# Patient Record
Sex: FEMALE | Race: WHITE | NOT HISPANIC OR LATINO | Employment: FULL TIME
[De-identification: names, ages, dates, MRNs, and addresses within clinical notes are randomized per-mention and may not be internally consistent; named-entity substitution may affect disease eponyms.]

---

## 2017-08-03 NOTE — TELEPHONE ENCOUNTER
Patient came into office and stated that she had labs done that were ordered by Dr. Jose Garay, psychiatrist. Patient stated that he had not received a copy of the lab results.  Patient was told to come into our office, as Dr. Elif Hanna is her PCP, and have

## 2017-08-23 ENCOUNTER — CHARTING TRANS (OUTPATIENT)
Dept: OTHER | Age: 22
End: 2017-08-23

## 2017-08-25 NOTE — PROGRESS NOTES
Patient is here with parent/guardian for a yearly physical exam. The patient is feeling well and no complaints per patient and/or parent or guardian.       Dizziness/chest pain/SOB or excessive fatigue with exercise: No  Unexplained fainting or near-faintin distress  Head: normocephalic, atraumatic  Eyes: YANNICK, EOMI, cornea and conjunctiva clear  Ears:  tympanic membranes intact bilaterally with out reddening or retraction, external canals appear normal  Nose: pink nasal mucosa without discharge, nares paten

## 2017-09-27 NOTE — PROGRESS NOTES
HPI:    Patient ID: Jerrol Bosworth is a 23year old female.     HPI  Patient presents with:  Sore Throat: more like burning - getting worse - family at home sick but started with her  Headache: turn into migraines  Cough/URI      Review of Systems  Except f (DIFLUCAN) 150 MG Oral Tab 1 tablet 0      Sig: Take 1 tablet (150 mg total) by mouth once.            Imaging & Referrals:  None       DS#[id number]

## 2017-10-12 NOTE — PROGRESS NOTES
HPI:    Patient ID: Tanya Moise is a 23year old female. HPI  Patient states that she does cheerleading and has frequently got her head bumped during that.   Over the past 2-3 weeks she has noticed increased headaches which have become daily along wit normal, negative Romberg sign, coordination with finger to nose and heel to shin intact. No focal neurologic deficit noted. Psychiatric:  mood and affect are normal, no apparent thought disorder, judgement and insight appear intact.            ASSESSMENT/P

## 2018-08-30 ENCOUNTER — LAB SERVICES (OUTPATIENT)
Dept: OTHER | Age: 23
End: 2018-08-30

## 2018-08-30 ENCOUNTER — CHARTING TRANS (OUTPATIENT)
Dept: OTHER | Age: 23
End: 2018-08-30

## 2018-09-04 ENCOUNTER — CHARTING TRANS (OUTPATIENT)
Dept: OTHER | Age: 23
End: 2018-09-04

## 2018-09-06 LAB
C TRACH RRNA SPEC QL NAA+PROBE: NEGATIVE
N GONORRHOEA RRNA SPEC QL NAA+PROBE: NEGATIVE
SPECIMEN SOURCE: NORMAL
SPECIMEN SOURCE: NORMAL
T VAGINALIS RRNA SPEC QL NAA+PROBE: NEGATIVE

## 2018-11-03 VITALS
SYSTOLIC BLOOD PRESSURE: 100 MMHG | BODY MASS INDEX: 26.66 KG/M2 | WEIGHT: 156.13 LBS | HEIGHT: 64 IN | DIASTOLIC BLOOD PRESSURE: 68 MMHG | HEART RATE: 70 BPM

## 2018-11-09 NOTE — ED NOTES
Knee immobilizer applied to left leg. Patient did not tolerate it very well tearful on cart  Family remains bedside.
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
## 2018-11-09 NOTE — ED PROVIDER NOTES
Patient has history of patellar dislocation. Patient was standing and twisted her body. She felt as though her patella dislocated. She continues to have severe pain in the left knee with inability to bear weight and bend.     On inspection, there is some

## 2018-11-10 NOTE — ED PROVIDER NOTES
Patient Seen in: [facility] Emergency Department    History   Patient presents with:  Lower Extremity Injury (musculoskeletal)    Stated Complaint: Lt knee sharp pain when she went to turn her body.  past hx of dislocating this *    32-year-old femal negative. Positive for stated complaint: Lt knee sharp pain when she went to turn her body. past hx of dislocating this *  Other systems are as noted in HPI. Constitutional and vital signs reviewed.       All other systems reviewed and negative except takes less than 2 seconds. She is not diaphoretic. Psychiatric: She has a normal mood and affect. Her behavior is normal. Judgment and thought content normal.   Nursing note and vitals reviewed.           ED Course   Labs Reviewed - No data to display  Xr diagnosis)    Disposition:  Discharge  11/9/2018  5:39 pm    Follow-up:  Balbir Bello MD  [address] 21 40 10    In 1 week  As needed, If symptoms worsen        Medications Prescribed:  Discharge [facility]

## 2018-11-27 VITALS
BODY MASS INDEX: 28.79 KG/M2 | SYSTOLIC BLOOD PRESSURE: 110 MMHG | WEIGHT: 162.5 LBS | HEIGHT: 63 IN | DIASTOLIC BLOOD PRESSURE: 70 MMHG

## 2019-03-20 NOTE — PROGRESS NOTES
CHIEF COMPLAINT:   Patient presents with:  Cough: cough is dry and with phlegm, ear discomfort, body sweats,  headches, nose congestion, upset stomach, body aches, sore throat x 3 dys       HPI:   Katherine Morales is a 26year old female who presents for upp LUNGS: denies shortness of breath or wheezing, See HPI  CARDIOVASCULAR: denies chest pain or palpitations   GI: denies N/V/C or abdominal pain  NEURO: + intermittent headaches    EXAM:   /64   Pulse 87   Temp 98.3 °F (36.8 °C) (Oral)   Ht 63\"   Wt 1 You have a viral upper respiratory illness (URI), which is another term for the common cold. This illness is contagious during the first few days. It is spread through the air by coughing and sneezing.  It may also be spread by direct contact (touching the · Cough with lots of colored sputum (mucus)  · Severe headache; face, neck, or ear pain  · Difficulty swallowing due to throat pain  · Fever of 100.4°F (38°C) or higher, or as directed by your healthcare provider  Call 911  Call 911 if any of these occur: · For sore throats caused by allergies, try antihistamines to block the allergic reaction. · Remember: unless a sore throat is caused by a bacterial infection, antibiotics won’t help you.   Prevent future sore throats  Prevention tips include the following

## 2019-04-29 NOTE — ED NOTES
Pt provided water and instructed on small sips to see how patient tolerates.  Will recheck in 10 min

## 2019-04-29 NOTE — ED INITIAL ASSESSMENT (HPI)
Pt c/o nausea and vomiting x48 hours - was seen at [facility] in [place] and was told that there was nothing wrong     4 years had same episode for around 2 weeks - no conclusion on why she felt that way

## 2019-04-29 NOTE — TELEPHONE ENCOUNTER
Called pt to schedule Follow up as pt was seen in ER for Nausea/Vomiting/Diarrhea (gastrointestinal)     Pt states she will call back was resting.

## 2019-04-29 NOTE — ED PROVIDER NOTES
Patient Seen in: [facility] Emergency Department    History   Patient presents with:  Nausea/Vomiting/Diarrhea (gastrointestinal)    Stated Complaint: n/v    HPI    Patient has a history of dual diagnosis depression, anxiety, and cannabis use.   She Use      Smoking status: Current Every Day Smoker        Packs/day: 0.00      Smokeless tobacco: Never Used      Tobacco comment: 4-5/day    Alcohol use: Not on file      Comment: \"Rarely\"  Last drink New Year's Codi    Drug use: Yes      Frequency: 4.0 t components within normal limits   URINALYSIS, ROUTINE - Abnormal; Notable for the following components:    Clarity Urine Hazy (*)     Spec Gravity 1.032 (*)     Ketones Urine 80  (*)     Blood Urine Moderate (*)     Protein Urine 100  (*)     RBC URINE 3-5 nausea. She was also treated with Pepcid     CBC shows elevated white count with a left shift on differential.  Hemoglobin platelets are normal  Metabolic panel shows mild hypokalemia treated with oral potassium supplement.   Creatinine was normal.  Mild e

## 2019-04-29 NOTE — ED NOTES
Pt requesting ice chips. Ice chips provided along with zofran. Pt offered suppository medication for home for nausea vomiting to which patient states she would try. Patient has no questions/concerns voiced.      Bedside report to [place]

## 2019-04-30 PROBLEM — E87.1 HYPONATREMIA: Status: ACTIVE | Noted: 2019-04-30

## 2019-04-30 PROBLEM — R79.89 AZOTEMIA: Status: ACTIVE | Noted: 2019-04-30

## 2019-04-30 PROBLEM — R73.9 HYPERGLYCEMIA: Status: ACTIVE | Noted: 2019-04-30

## 2019-04-30 PROBLEM — E86.0 DEHYDRATION: Status: ACTIVE | Noted: 2019-04-30

## 2019-04-30 NOTE — TELEPHONE ENCOUNTER
Spoke with mother, pt is sipping water,   Urinating well, last vomiting 10 minutes ago. Inquired how many times pt has vomiting today and mother answered, \"Too many to count\". Loose stools not since Friday, no BM since. Denies Fever.   Mother states pt h

## 2019-04-30 NOTE — ED INITIAL ASSESSMENT (HPI)
Patient c/o five days of N/V/D. States she has been seen at two other emergency rooms for same symptoms. Today, feels weak and dizzy. Unable to hold down even small sips of liquid. Urinated 4-5 days today.

## 2019-04-30 NOTE — TELEPHONE ENCOUNTER
The mother states the patient is still sick. She has not able to keep down water or meds since Friday. She was out of state in [place] Fri & Sat where they had ecoli reporting.  The patient was in the ER in [place] and also when she came back on Sunday to the ER at

## 2019-04-30 NOTE — TELEPHONE ENCOUNTER
Per Dr. Ramos Zepeda: due to excessive, repeated vomiting go to ER. Mother informed, expressed understanding and agreement. Task completed.

## 2019-05-01 PROBLEM — E87.6 HYPOKALEMIA: Status: ACTIVE | Noted: 2019-05-01

## 2019-05-01 PROBLEM — R11.2 INTRACTABLE VOMITING WITH NAUSEA, UNSPECIFIED VOMITING TYPE: Status: ACTIVE | Noted: 2019-05-01

## 2019-05-01 NOTE — ED PROVIDER NOTES
Patient Seen in: [facility] [place]    History   Patient presents with:  Abdomen/Flank Pain (GI/)  Nausea/Vomiting/Diarrhea (gastrointestinal)    Stated Complaint: [address] [place] N/V +ABD PAIN     HPI    Patient presents with Comment: \"Rarely\"  Last drink New Year's Codi    Drug use: Yes      Frequency: 4.0 times per week      Types: Cannabis      Comment: 1 gm 3-4 times/week      Review of Systems    Positive for stated complaint: STATES 5TH DAY OF N/V +ABD PAIN   Other syste Abnormal; Notable for the following components:    Potassium 2.7 (*)     All other components within normal limits   URINE MICROSCOPIC W REFLEX CULTURE - Abnormal; Notable for the following components:    Bacteria Urine Rare (*)     Squamous Epi.  Cells Mod PANCREAS:  No lesion, fluid collection, ductal dilatation, or atrophy. SPLEEN:  No enlargement or focal lesion. KIDNEYS:  No mass, obstruction, or calcification. ADRENALS:  No mass or enlargement. AORTA/VASCULAR:  No aneurysm or dissection.   RETROPERIT given a potassium solution and initially felt okay but then ultimately vomited that as well. MDM   The patient has had 3 visits now for vomiting that has been intractable to medication.   She is hypokalemic and still barely able to tolerate sips of water

## 2019-05-01 NOTE — OPERATIVE REPORT
Operative Report-Esophagogastroduodenoscopy      PREOPERATIVE DIAGNOSIS/INDICATION:  1. Epigastric pain  2. Nausea vomiting  POSTOPERTATIVE DIAGNOSIS:  1.  Severe Class D esophagitis extending from UES to LES with friability, oozing biopsies.  - IV PPI bid  - Reglan/Zofran/Ativan as needed for nausea  - Clear liquid diet  - Home when able to tolerated full liquids.   CC Report:     Fausto Berg  5/1/2019  10:31 AM

## 2019-05-01 NOTE — PLAN OF CARE
Pt alert and oriented x4 this Am, slept a little overnight. Pt denies SOB and VSS. Potassium infusing, per protocol. GI consulted, in to see, will do EGD this Am. Consent to be obtained in Endo. Suicide precautions in place.  Pt stable and an accompanied by

## 2019-05-01 NOTE — ED NOTES
Patient states she is feeling better and wants to go home. States having decrease abdominal cramping and feels hungry. Advised of low potassium and treatment. Patient verbalized understanding.

## 2019-05-01 NOTE — CONSULTS
[facility]                       Gastroenterology [address] [facility] Gastroenterology    Charmayne Macho Patient Status:  Observation    1995 MRN [id number]   [facility] ENDOSCOPY Attending Nannette Madden MD   [facility] chloride IVPB premix 20 mEq 20 mEq Intravenous Once   [COMPLETED] sodium chloride 0.9% IV bolus 1,000 mL 1,000 mL Intravenous Once   [COMPLETED] Metoclopramide HCl (REGLAN) injection 10 mg 10 mg Intravenous Once   [COMPLETED] diphenhydrAMINE HCl (BENADRYL) chronic arthritis, myalgias, arthralgias            Genitourinary:  The patient reports no history of recurrent urinary tract infections, hematuria, dysuria, or nephrolithiasis           Psychiatric: The patient reports no history of depression, anxiety, s 05/01/2019     Recent Labs   Lab 04/28/19 2037 04/30/19  1853 04/30/19  1933 05/01/19  0528   * 117*  --  122*   BUN 17 18  --  13   CREATSERUM 0.85 0.76  --  0.62   GFRAA 113 130  --  149   GFRNAA 98 112  --  129   CA 9.5 9.1  --  8.4*    13

## 2019-05-01 NOTE — CONSULTS
[facility]  Report of Psychiatric Consultation    Zaira Paz Patient Status:  Observation    1995 MRN [id number]   [facility] 4NW-A Attending Marcelo Gibson MD   [facility] Day # 1 PCP Radha Johnson DO     Date of Admission:  motivation, and difficult with sleep and concentration, but NO hopelessness, voices/visions, or paranoid ideation. No elevated mood, racing thoughts, decreased need for sleep, grandiose thoughts.     Past Psychiatric History:   1) Major depressive disorder, Anxiety Brother    • Diabetes Maternal Grandmother       reports that she quit smoking 8 days ago. She smoked 0.00 packs per day. She has never used smokeless tobacco. She reports that she has current or past drug history. Drug: Cannabis.  Frequency: 4.00 t Congruent    Thought process: logical  Thought content: no delusions  Perceptions: no hallucinations  Associations: Intact    Orientation: Oriented person, place, time, situation  Attention and Concentration:   fair  Memory:  intact recent and intact remot

## 2019-05-01 NOTE — ANESTHESIA PREPROCEDURE EVALUATION
PRE-OP EVALUATION    Patient Name: Michael Senior    Pre-op Diagnosis: NAUSEA,VOMITTING    Procedure(s):  ESOPHAGOGASTRODUODENOSCOPY    Surgeon(s) and Role:     Alexis Gong MD - Primary    Pre-op vitals reviewed.   Temp: 98.9 °F (37.2 °C)  Pulse Powd Pack Take 20 mEq by mouth daily for 5 doses. Disp: 5 packet Rfl: 0   ondansetron 8 MG Oral Tablet Dispersible Take 8 mg by mouth every 12 (twelve) hours as needed for Nausea.  Disp:  Rfl:    Promethazine HCl 25 MG Oral Tab Take 25 mg by mouth every 6 ( Cannabis   Comment: 1 gm 3-4 times/week     Available pre-op labs reviewed.   Lab Results   Component Value Date    WBC 9.4 05/01/2019    RBC 4.53 05/01/2019    HGB 13.4 05/01/2019    HCT 39.4 05/01/2019    MCV 87.0 05/01/2019    MCH 29.6 05/01/2019    MCHC

## 2019-05-01 NOTE — PROGRESS NOTES
IM addendum to Dr. Dar Becker H&P:    Pt seen and examined. Back from EGD. C/o throat pain, chest pain, nausea.     /61   Pulse 77   Temp 98.9 °F (37.2 °C) (Oral)   Resp 16   Ht 5' 3\" (1.6 m)   Wt 160 lb 14.4 oz (73 kg)   LMP 04/14/2019   SpO2 94%

## 2019-05-01 NOTE — H&P
[facility]                                                               History & Physical         Shahram Horton Patient Status:  Observation    1995 MRN [id number]   [facility] 4NW-A Attending Fuasto Maldonado MD   [address] Day # Diagnosis Date   • Depression    • Eating disorder    • Factor V Leiden deficiency    • Fibromyalgia    • Headache    • Intractable vomiting    • Migraines    • Primary Raynaud's phenomenon        Past Surgical History:   Procedure Laterality Date   • ES nightly. Disp:  Rfl: 0 Taking   Levonorgest-Eth Estrad 91-Day 0.15-0.03 MG Oral Tab Take 1 tablet by mouth once daily. Disp:  Rfl: 0 Not Taking       Review of Systems:  A comprehensive 14 point review of systems was completed.   Pertinent positives and neg irritable bowel syndrome, rule out marijuana hyperemesis syndrome, rule out eating disorder with previous history of eating disorder and depression  1. IV Zofran as needed. 2.   IV Protonix. 3.  IV fluids. 4.  Will keep n.p.o.   5.  GI consult.    6.

## 2019-05-01 NOTE — PLAN OF CARE
NURSING ADMISSION NOTE      Patient admitted via Wheelchair  Oriented to room. Safety precautions initiated. Bed in low position. Call light in reach.     Pt is alert and oriented X4, is very sleepy,no c/o pain, mother at bedside, pt had 3 episodes o

## 2019-05-01 NOTE — ED NOTES
REPORT CALLED AND GIVEN TO JACOBO GARLAND OK PER MD FOR FAMILY TO DRIVE TO HOSPITAL, PT WILL BE ADM TO ROOM 406.

## 2019-05-01 NOTE — ANESTHESIA POSTPROCEDURE EVALUATION
[address] [facility] Patient Status:  Observation   Age/Gender 26year old female MRN [id number]   Location [address] [facility]. Attending Anshu Cole, [facility] Se Day # 0 PCP Ash Fontanez DO       Anesthesia Post-op Note    Procedure(

## 2019-05-02 NOTE — DIETARY NOTE
Lenin Fisher [address]     Admitting diagnosis:  Dehydration [E86.0]  Hypokalemia [E87.6]  Intractable vomiting with nausea, unspecified vomiting type [R11.2]    Ht: 160 cm (5' 3\")  Wt: 73 kg (160 lb 14.4 oz).    BMI: Body m

## 2019-05-02 NOTE — BH PROGRESS NOTE
Psychiatry consult completed. Patient plans to follow-up with her current outpatient psychiatrist.  She has a therapist as well, but is considering switching. She is not interested in a dual dx program at this time.  No follow-up planned by psych liaison u

## 2019-05-02 NOTE — PLAN OF CARE
Problem: PAIN - ADULT  Goal: Verbalizes/displays adequate comfort level or patient's stated pain goal  Description  INTERVENTIONS:  - Encourage pt to monitor pain and request assistance  - Assess pain using appropriate pain scale  - Administer analgesics replacement as ordered  - Monitor response to electrolyte replacements, including rhythm and repeat lab results as appropriate  - Fluid restriction as ordered  - Instruct patient on fluid and nutrition restrictions as appropriate  Outcome: Progressing   AA

## 2019-05-02 NOTE — PROGRESS NOTES
Gastroenterology Progress Note  S: Still with nausea, retching. Tolerating small amounts of clears  O: BP 99/57 (BP Location: Left arm)   Pulse 71   Temp 98.7 °F (37.1 °C) (Oral)   Resp 18   Ht 63\"   Wt 160 lb 14.4 oz   LMP 04/14/2019   SpO2 98%   BMI 28.

## 2019-05-02 NOTE — PROGRESS NOTES
[facility]  Progress Note     Mark Lazaro Patient Status:  Observation    1995 MRN [id number]   [facility] 4NW-A Attending Brien Qiu MD   Hosp Day # 0 PCP Sarai Palma DO     Chief Complaint: Nausea, vomiting, chest 13* 16  --    ALT 59* 45  --  33  --    BILT 1.4 1.5  --  1.2  --    TP 7.9 8.6*  --  6.8  --        Estimated Creatinine Clearance: 118.7 mL/min (based on SCr of 0.62 mg/dL). No results for input(s): PTP, INR in the last 168 hours.     No results for in

## 2019-05-03 NOTE — PLAN OF CARE
Problem: Patient/Family Goals  Goal: Patient/Family Long Term Goal  Description  Patient's Long Term Goal:    Interventions:  -   - See additional Care Plan goals for specific interventions  Outcome: Completed  Goal: Patient/Family Short Term Goal  Descr needed  - Optimize oral hygiene and moisture  - Encourage food from home; allow for food preferences  - Enhance eating environment  Outcome: Completed     Problem: METABOLIC/FLUID AND ELECTROLYTES - ADULT  Goal: Electrolytes maintained within normal limits

## 2019-05-03 NOTE — PROGRESS NOTES
Patient okay for discharge. PIV removed, discharge instruction given and explained to patient in detail. All questions were answered. Scripts given to patient. Patient refused transport. Walked by herself with father at discharge.

## 2019-05-03 NOTE — PROGRESS NOTES
Gastroenterology Progress Note  S: No further vomiting yesterday, still with odynophagia, but tolerating liquids, some fruit and smoothies  O: /86 (BP Location: Right arm)   Pulse 90   Temp 98.1 °F (36.7 °C) (Oral)   Resp 16   Ht 63\"   Wt 160 lb 14.

## 2019-05-03 NOTE — PLAN OF CARE
Patient alert and oriented X4. Family and friends at bedside, in high spirit. No complaints of pain at this time. Denies nausea and vomiting. Tolerating Full liquid Diet so far. Vital signs stable. Call light in reach.        Problem: PAIN - ADULT  Goal: Ve METABOLIC/FLUID AND ELECTROLYTES - ADULT  Goal: Electrolytes maintained within normal limits  Description  INTERVENTIONS:  - Monitor labs and rhythm and assess patient for signs and symptoms of electrolyte imbalances  - Administer electrolyte replacement a

## 2019-05-03 NOTE — PROGRESS NOTES
[facility]  Progress Note     Zaira Paz Patient Status:  Observation    1995 MRN [id number]   [facility] 4NW-A Attending Marcelo Gibson MD   Hosp Day # 0 PCP Radha Johnson DO     Chief Complaint: Nausea, vomiting, chest --  1.2  --    TP 7.9 8.6*  --  6.8  --        Estimated Creatinine Clearance: 118.7 mL/min (based on SCr of 0.62 mg/dL). No results for input(s): PTP, INR in the last 168 hours. No results for input(s): TROP, CK in the last 168 hours.          Imagin

## 2019-05-08 NOTE — PROGRESS NOTES
Results reviewed. Released to [address] Tests show no significant abnormalities other than severe reflux esophagitis, negative for infection.

## 2019-05-26 NOTE — DISCHARGE SUMMARY
[facility]  DISCHARGE SUMMARY     Luan Syed Patient Status:  Observation    1995 MRN [id number]   [facility] 4NW-A Attending No att. providers found   Hosp Day # 0 PCP Maria Luisa Nevarez DO     Date of Admission: 2019 severe depression and was hospitalized for this in January 2019. Brief Synopsis: Patient was admitted with complaints of intractable nausea, vomiting, abdominal pain. He she was seen by GI in consultation. She was started on PPI.   She underwent EGD wh total) by mouth 3 (three) times daily as needed (Anxiety, sleep). Quantity:  30 capsule  Refills:  0     Levonorgest-Eth Estrad 91-Day 0.15-0.03 MG Tabs  Commonly known as:  SEASONALE      Take 1 tablet by mouth once daily.    Refills:  0     ondansetron [id number]  [phone]    Go on 5/29/2019  for a follow-up with the GI nurse practitoner at 9 am     Appointments for Next 30 Days 5/26/2019 - 6/25/2019      Date Arrival Time Visit Type Length Department Provider     5/29/2019  9:20

## 2019-06-15 NOTE — PROGRESS NOTES
CHIEF COMPLAINT:   Patient presents with:  Eye Problem: left eye watery, red, sensitivty to light x2 days      HPI:   Ion Ardon is a 26year old female who presents with chief concern of \"pink eye\". Patient reports redness, watering, and photophobia Frida Mix MD at [facility] ENDOSCOPY   • ESOPHAGOGASTRODUODENOSCOPY (EGD) N/A 6/9/2014    Performed by Lupe Willams MD at [facility] ENDOSCOPY   • OTHER      tubes in ears when child    • WISDOM TEETH REMOVED        Family History   Problem Relation A visible discharge. No pain to periorbital region  HENT: atraumatic, normocephalic,ears and throat are clear  NECK: supple, non tender  LUNGS: clear to auscultation bilaterally. CARDIO: RRR without murmur  LYMPH: no preauricular lymphadenopathy.  No cervic

## 2019-09-05 ENCOUNTER — OFFICE VISIT (OUTPATIENT)
Dept: OBGYN | Age: 24
End: 2019-09-05

## 2019-09-05 VITALS
BODY MASS INDEX: 28.21 KG/M2 | DIASTOLIC BLOOD PRESSURE: 70 MMHG | WEIGHT: 159.2 LBS | SYSTOLIC BLOOD PRESSURE: 112 MMHG | HEIGHT: 63 IN

## 2019-09-05 DIAGNOSIS — Z30.09 BIRTH CONTROL COUNSELING: ICD-10-CM

## 2019-09-05 DIAGNOSIS — Z32.02 PREGNANCY EXAMINATION OR TEST, NEGATIVE RESULT: ICD-10-CM

## 2019-09-05 DIAGNOSIS — Z87.42 HISTORY OF DYSMENORRHEA: Primary | ICD-10-CM

## 2019-09-05 DIAGNOSIS — F32.81 PMDD (PREMENSTRUAL DYSPHORIC DISORDER): ICD-10-CM

## 2019-09-05 PROBLEM — F32.A ANXIETY AND DEPRESSION: Status: ACTIVE | Noted: 2019-09-05

## 2019-09-05 PROBLEM — F41.9 ANXIETY AND DEPRESSION: Status: ACTIVE | Noted: 2019-09-05

## 2019-09-05 PROBLEM — D68.51 FACTOR V LEIDEN (CMD): Status: ACTIVE | Noted: 2019-09-05

## 2019-09-05 PROBLEM — F39 MOOD DISORDER (CMD): Status: ACTIVE | Noted: 2019-09-05

## 2019-09-05 LAB — B-HCG UR QL: NEGATIVE

## 2019-09-05 PROCEDURE — 81025 URINE PREGNANCY TEST: CPT | Performed by: OBSTETRICS & GYNECOLOGY

## 2019-09-05 PROCEDURE — 99214 OFFICE O/P EST MOD 30 MIN: CPT | Performed by: OBSTETRICS & GYNECOLOGY

## 2019-09-05 RX ORDER — LEVONORGESTREL AND ETHINYL ESTRADIOL 0.15-0.03
1 KIT ORAL DAILY
Qty: 91 TABLET | Refills: 3 | Status: SHIPPED | OUTPATIENT
Start: 2019-09-05 | End: 2019-12-12 | Stop reason: SDUPTHER

## 2019-09-05 SDOH — SOCIAL STABILITY: SOCIAL INSECURITY
WITHIN THE LAST YEAR, HAVE TO BEEN RAPED OR FORCED TO HAVE ANY KIND OF SEXUAL ACTIVITY BY YOUR PARTNER OR EX-PARTNER?: NO

## 2019-09-05 SDOH — HEALTH STABILITY: MENTAL HEALTH: HOW OFTEN DO YOU HAVE 6 OR MORE DRINKS ON ONE OCCASION?: NEVER

## 2019-09-05 SDOH — SOCIAL STABILITY: SOCIAL INSECURITY: WITHIN THE LAST YEAR, HAVE YOU BEEN AFRAID OF YOUR PARTNER OR EX-PARTNER?: NO

## 2019-09-05 SDOH — HEALTH STABILITY: MENTAL HEALTH: HOW MANY STANDARD DRINKS CONTAINING ALCOHOL DO YOU HAVE ON A TYPICAL DAY?: 1 OR 2

## 2019-09-05 SDOH — SOCIAL STABILITY: SOCIAL INSECURITY: WITHIN THE LAST YEAR, HAVE YOU BEEN HUMILIATED OR EMOTIONALLY ABUSED IN OTHER WAYS BY YOUR PARTNER OR EX-PARTNER?: NO

## 2019-09-05 SDOH — SOCIAL STABILITY: SOCIAL INSECURITY
WITHIN THE LAST YEAR, HAVE YOU BEEN KICKED, HIT, SLAPPED, OR OTHERWISE PHYSICALLY HURT BY YOUR PARTNER OR EX-PARTNER?: NO

## 2019-09-05 SDOH — HEALTH STABILITY: MENTAL HEALTH: HOW OFTEN DO YOU HAVE A DRINK CONTAINING ALCOHOL?: MONTHLY OR LESS

## 2019-09-05 ASSESSMENT — PATIENT HEALTH QUESTIONNAIRE - PHQ9
2. FEELING DOWN, DEPRESSED OR HOPELESS: NOT AT ALL
1. LITTLE INTEREST OR PLEASURE IN DOING THINGS: NOT AT ALL
SUM OF ALL RESPONSES TO PHQ9 QUESTIONS 1 AND 2: 0
SUM OF ALL RESPONSES TO PHQ9 QUESTIONS 1 AND 2: 0

## 2019-09-17 NOTE — PROGRESS NOTES
Patient presents with:  ER F/U: [facility] for vomiting spells and was given zofran. HPI:  Patient is here for follow up. She was admitted at [address] [place] [address] for abdominal pain.  She has chronic hx of recurrent abdom Patient complains of abdominal pain. The pain is located entire abdomen. The pain is described as aching, and is 2/10 in intensity. Onset was a few months ago. Symptoms have been stable.  Aggravating factors include alcohol, eating and spicy foods Alleviati Recurrent major depressive disorder, in partial remission ([facility]     Marijuana dependence ([facility]      Past Medical History:   Diagnosis Date   • Depression    • Eating disorder    • Factor V Leiden deficiency    • Fibromyalgia    • Headache    • Intractab estradiol 0.025 MG/24HR Transdermal Patch Weekly Place 1 patch onto the skin. Disp:  Rfl:    Dicyclomine HCl 10 MG Oral Cap Take 1 capsule (10 mg total) by mouth 3 (three) times daily as needed.  Disp: 30 capsule Rfl: 0   ondansetron 8 MG Oral Tablet Disper Neck: Normal range of motion. Neck supple. Normal carotid pulses and no JVD present. No edema present. No mass and no thyromegaly present. Cardiovascular: Normal rate, regular rhythm and intact distal pulses. No murmur, rubs or gallops.    Pulmonary/Ches Ermias Baker is a 26year old female who presents with chronic abd pain with EGD results from May 2019 showing ulcerative esophagitis. She is to have a repeat EGD with GI.    The pathophysiology of reflux was discussed; anti-reflux measures such as raisin I spent 40 minutes face to face with the patient. More than 50% of that time was spent counseling the patient and/or on coordination of care. The diagnosis, prognosis, and general treatment was explained to the patient and the family.     Patient Instruct · Acid regurgitation  · Chronic sore throat  · Gum Inflammation  · Cavities  · Bad breath  · Nausea  · Pain in your upper belly (abdomen)  · Bleeding (indicated by bright red vomit or black, tarry stool)  These symptoms occur more often with reflux esophag · Take small bites and chew your food thoroughly. · Avoid large meals and heavy evening meals. Don't lie down within 2 to 3 hours of eating. · Get to or stay at a healthy weight. · Avoid alcohol, caffeine, and smoking or tobacco products.   · Brush and f Symptoms include a drippy, stuffy, and itchy nose. They also include sneezing and red and itchy eyes. You may feel tired more often.  Severe allergies may also affect your breathing and trigger a condition called asthma.   Tests can be done to see what ángel Call your healthcare provider right away if the following occur:  · Coughing or wheezing  · Fever of 100.4°F (38°C) or higher, or as directed by your healthcare provider  · Raised red bumps (hives)  · Continuing symptoms, new symptoms, or worsening symptom

## 2019-09-17 NOTE — PATIENT INSTRUCTIONS
Esophagitis     With esophagitis, the lining of the esophagus is inflamed. Do you often have burning pain in your chest? You may have esophagitis.  This is when the lining of the esophagus becomes red and swollen (inflamed). The esophagus is the tube th · Coughing, wheezing, or asthma  · Hoarseness  Diagnosis of esophagitis  Your healthcare provider will ask about your health history and symptoms. You’ll also be examined. Sometimes certain tests are needed.  These may include:  · Upper endoscopy. A thin, f · Raise your upper body by 4 to 6 inches when lying in bed. This can be done using a foam wedge. Or put blocks under the legs at the head of your bed. Surgery.  This may be needed for severe reflux esophagitis. Other noninvasive procedures to treat GERD an Home care  Your healthcare provider may prescribe medicines to help relieve allergy symptoms. These may include oral medicines, nasal sprays, or eye drops.   Ask your provider for advice on how to avoid substances that you are allergic to. Below are a few t · Severe swelling of the face or severe itching of the eyes or mouth  Date Last Reviewed: 3/1/2017  © 9264-4427 [employer]. [address] [facility], [address] [place] [address]. All rights reserved.  This information is not intended as a substitute for

## 2019-10-01 PROBLEM — F32.A ANXIETY AND DEPRESSION: Status: ACTIVE | Noted: 2019-09-05

## 2019-10-01 PROBLEM — F39 MOOD DISORDER (HCC): Status: ACTIVE | Noted: 2019-09-05

## 2019-10-01 PROBLEM — D68.51 FACTOR V LEIDEN (HCC): Status: ACTIVE | Noted: 2019-09-05

## 2019-10-01 PROBLEM — F41.9 ANXIETY AND DEPRESSION: Status: ACTIVE | Noted: 2019-09-05

## 2019-10-01 NOTE — PROGRESS NOTES
Patient presents with: Follow - Up: 2 week follow up for abdominal pain and vomiting spells. Was given referal last visit and was to follow up with GI.      HPI:    Allergic Rhinitis  Tanya Moise is here for f/u of allergic rhinitis.  Patient's symptoms not bruise/bleed easily. Psychiatric/Behavioral: The patient is not nervous/anxious. No depression.     Patient Active Problem List:     PTSD (post-traumatic stress disorder)     Unspecified episodic mood disorder     Migraine     Eating disorder     Hype Former Smoker        Packs/day: 0.00        Quit date: 2019        Years since quittin.4      Smokeless tobacco: Never Used      Tobacco comment: 4-5/day    Alcohol use: Not on file      Comment: \"Rarely\"  Last drink New Year's Codi    Drug use: 01/19/1999      IPV                   11/22/1997  02/10/1998  01/19/1999                            08/08/2003      MMR                   10/06/1998  08/08/2003      Meningococcal-Menactra                          08/12/2016      TDAP Future  - CHLAMYDIA/GONOCOCCUS, KAIDEN    Orders Placed This Encounter      Chlamydia/Gc Amplification Urine      Meds & Refills for this Visit:  Requested Prescriptions     Signed Prescriptions Disp Refills   • Meclizine HCl 25 MG Oral Tab 30 tablet 0     Si
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
## 2019-10-08 PROBLEM — E86.0 DEHYDRATION: Status: RESOLVED | Noted: 2019-04-30 | Resolved: 2019-10-08

## 2019-10-08 PROBLEM — R73.9 HYPERGLYCEMIA: Status: RESOLVED | Noted: 2019-04-30 | Resolved: 2019-10-08

## 2019-10-08 PROBLEM — K22.10 ESOPHAGEAL ULCER WITHOUT BLEEDING: Status: ACTIVE | Noted: 2019-10-08

## 2019-10-08 PROBLEM — K25.7 CHRONIC GASTRIC ULCER WITHOUT HEMORRHAGE AND WITHOUT PERFORATION: Status: ACTIVE | Noted: 2019-10-08

## 2019-10-08 PROBLEM — E87.1 HYPONATREMIA: Status: RESOLVED | Noted: 2019-04-30 | Resolved: 2019-10-08

## 2019-10-08 PROBLEM — E87.6 HYPOKALEMIA: Status: RESOLVED | Noted: 2019-05-01 | Resolved: 2019-10-08

## 2019-10-08 PROBLEM — R79.89 AZOTEMIA: Status: RESOLVED | Noted: 2019-04-30 | Resolved: 2019-10-08

## 2019-10-08 NOTE — PROGRESS NOTES
Patient presents with:  Physical: Routine annual physical- no pap- she is scheduled with ob/gyn       HPI:  Patient is here for her annual physical.   PMHx:   1. Sh ehas a hx of AD, MDD, PTSD, mood problem.  She works at [employer], she is not going to school rig STD hx: declined  Occupation:   Mammogram: pateranl aunt with breast cancer diagnosed in her 45s. Colonoscopy:  Pap smear: appt with OB in december , last pap - never had one.          Review of Systems   Constitutional: Negative for fever, chills and fat • Esophageal ulcer without bleeding 10/8/2019   • Factor V Leiden deficiency    • Fibromyalgia    • Headache    • Intractable vomiting    • Migraines    • Primary Raynaud's phenomenon      Past Surgical History:   Procedure Laterality Date   • ESOPHAGOGAST Bupropion               HIVES  Cefdinir                HIVES  Loperamide              OTHER (SEE COMMENTS)    Comment:Tongue turns black             Tongue turns black             Tongue turns black  Cefdinir                HIVES  Dust Mite Extract       O Abdominal: Soft. Bowel sounds are normal. Non tender, no masses, no organomegaly or hernias. Musculoskeletal: Normal range of motion. No edema and no tenderness. No effusions. Lymphadenopathy: No cervical adenopathy. Neurological: Normal reflexes.  No HPV (Gardasil 9) (67851)      Meds & Refills for this Visit:  Requested Prescriptions      No prescriptions requested or ordered in this encounter       Imaging & Consults:  HPV HUMAN PAPILLOMA VIRUS VACC 9 RADHA 3 DOSE IM  HPV HUMAN PAPILLOMA VIRUS VACC Chlamydia Sexually active women ages 22 and younger, and women at increased risk for infection (such as having multiple sex partners) Every year if you're at risk or have symptoms   Depression All women in this age group At routine exams   Type 2 diabetes, Hepatitis B Women at increased risk for infection should talk with their healthcare provider 3 doses over 6 months; second dose should be given 1 month after the first dose; the third dose should be given at least 2 months after the second dose and at leas Sexually transmitted infection prevention Women who are sexually active At routine exams   Skin cancer Prevention of skin cancer in fair-skinned adults At routine exams   Use of tobacco and the health effects it can cause All women in this age group Every

## 2019-10-08 NOTE — PATIENT INSTRUCTIONS
Prevention Guidelines, Women Ages 25 to 44  Screening tests and vaccines are an important part of managing your health. A screening test is done to find possible disorders or diseases in people who don't have any symptoms.  The goal is to find a disease e Type 2 diabetes, prediabetes All women diagnosed with gestational diabetes Lifelong testing every 3 years   Type 2 diabetes All women with prediabetes Every year   Gonorrhea Sexually active women at increased risk for infection At routine exams   Hepatitis Measles, mumps, rubella (MMR) All women in this age group who have no record of these infections or vaccines 1 or 2 doses   Meningococcal Women at increased risk for infection should talk with their healthcare provider 1 or more doses   Pneumococcal conjug © 5903-5393 [employer] [address]. [address] [facility], [facility] [place]. All rights reserved. This information is not intended as a substitute for professional medical care. Always follow your healthcare professional's instructions.

## 2019-10-10 NOTE — TELEPHONE ENCOUNTER
Name from pharmacy: AZELASTINE 0.1% (137 MCG) SPRY          Will file in chart as: AZELASTINE  MCG/SPRAY Nasal Solution    The source prescription was discontinued on 10/3/2019 by Ansley Drew, [address] [place] [address] Holly Sofia.     Sig: SPRAY 1 SPRAY INTO EACH NOSTRIL TWICE

## 2019-10-14 PROBLEM — E55.9 HYPOVITAMINOSIS D: Status: ACTIVE | Noted: 2019-10-14

## 2019-11-21 ENCOUNTER — WALK IN (OUTPATIENT)
Dept: URGENT CARE | Age: 24
End: 2019-11-21

## 2019-11-21 VITALS
HEART RATE: 96 BPM | SYSTOLIC BLOOD PRESSURE: 108 MMHG | TEMPERATURE: 98.2 F | DIASTOLIC BLOOD PRESSURE: 62 MMHG | RESPIRATION RATE: 14 BRPM

## 2019-11-21 DIAGNOSIS — J00 ACUTE NASOPHARYNGITIS: Primary | ICD-10-CM

## 2019-11-21 LAB
FLUAV AG UPPER RESP QL IA.RAPID: NEGATIVE
FLUBV AG UPPER RESP QL IA.RAPID: NEGATIVE
INTERNAL PROCEDURAL CONTROLS ACCEPTABLE: YES
S PYO AG THROAT QL IA.RAPID: NEGATIVE

## 2019-11-21 PROCEDURE — 99203 OFFICE O/P NEW LOW 30 MIN: CPT | Performed by: NURSE PRACTITIONER

## 2019-11-21 PROCEDURE — 87804 INFLUENZA ASSAY W/OPTIC: CPT | Performed by: NURSE PRACTITIONER

## 2019-11-21 PROCEDURE — 87880 STREP A ASSAY W/OPTIC: CPT | Performed by: NURSE PRACTITIONER

## 2019-11-21 RX ORDER — LEVONORGESTREL AND ETHINYL ESTRADIOL 0.15-0.03
KIT ORAL
COMMUNITY
Start: 2018-07-18 | End: 2020-08-31

## 2019-11-21 RX ORDER — ARIPIPRAZOLE 5 MG/1
TABLET ORAL DAILY
COMMUNITY
End: 2021-08-10 | Stop reason: DRUGHIGH

## 2019-11-21 RX ORDER — ERGOCALCIFEROL 1.25 MG/1
CAPSULE ORAL
Refills: 0 | COMMUNITY
Start: 2019-10-14 | End: 2022-08-31 | Stop reason: ALTCHOICE

## 2019-11-21 RX ORDER — MONTELUKAST SODIUM 10 MG/1
10 TABLET ORAL
COMMUNITY
Start: 2019-09-17 | End: 2020-09-11

## 2019-11-21 SDOH — SOCIAL STABILITY: SOCIAL INSECURITY: WITHIN THE LAST YEAR, HAVE YOU BEEN HUMILIATED OR EMOTIONALLY ABUSED IN OTHER WAYS BY YOUR PARTNER OR EX-PARTNER?: NO

## 2019-11-21 SDOH — SOCIAL STABILITY: SOCIAL INSECURITY: WITHIN THE LAST YEAR, HAVE YOU BEEN AFRAID OF YOUR PARTNER OR EX-PARTNER?: NO

## 2019-11-21 SDOH — HEALTH STABILITY: MENTAL HEALTH: HOW OFTEN DO YOU HAVE 6 OR MORE DRINKS ON ONE OCCASION?: NEVER

## 2019-11-21 SDOH — HEALTH STABILITY: MENTAL HEALTH: HOW MANY STANDARD DRINKS CONTAINING ALCOHOL DO YOU HAVE ON A TYPICAL DAY?: 1 OR 2

## 2019-11-21 SDOH — HEALTH STABILITY: MENTAL HEALTH: HOW OFTEN DO YOU HAVE A DRINK CONTAINING ALCOHOL?: MONTHLY OR LESS

## 2019-11-21 ASSESSMENT — ENCOUNTER SYMPTOMS
SWOLLEN GLANDS: 1
SORE THROAT: 1
FATIGUE: 0
SHORTNESS OF BREATH: 0
SINUS PRESSURE: 1
ABDOMINAL PAIN: 0
APPETITE CHANGE: 0
WHEEZING: 0
TROUBLE SWALLOWING: 0
DIZZINESS: 0
ACTIVITY CHANGE: 0
FEVER: 0
SINUS PAIN: 1
RHINORRHEA: 1
NAUSEA: 0
HEADACHES: 1
DIARRHEA: 0
VOMITING: 0
COUGH: 1

## 2019-12-10 PROBLEM — R11.2 NAUSEA WITH VOMITING, UNSPECIFIED: Status: ACTIVE | Noted: 2019-12-10

## 2019-12-10 PROBLEM — K21.00 GERD WITH ESOPHAGITIS: Status: ACTIVE | Noted: 2019-12-10

## 2019-12-12 ENCOUNTER — OFFICE VISIT (OUTPATIENT)
Dept: OBGYN | Age: 24
End: 2019-12-12

## 2019-12-12 VITALS
DIASTOLIC BLOOD PRESSURE: 74 MMHG | SYSTOLIC BLOOD PRESSURE: 120 MMHG | WEIGHT: 150.8 LBS | HEIGHT: 63 IN | BODY MASS INDEX: 26.72 KG/M2

## 2019-12-12 DIAGNOSIS — Z01.419 ENCOUNTER FOR ROUTINE GYNECOLOGICAL EXAMINATION WITH PAPANICOLAOU SMEAR OF CERVIX: Primary | ICD-10-CM

## 2019-12-12 DIAGNOSIS — Z11.3 ROUTINE SCREENING FOR STI (SEXUALLY TRANSMITTED INFECTION): ICD-10-CM

## 2019-12-12 DIAGNOSIS — Z11.51 SCREENING FOR HPV (HUMAN PAPILLOMAVIRUS): ICD-10-CM

## 2019-12-12 PROBLEM — E55.9 VITAMIN D DEFICIENCY: Status: ACTIVE | Noted: 2019-12-12

## 2019-12-12 PROCEDURE — 87591 N.GONORRHOEAE DNA AMP PROB: CPT | Performed by: OBSTETRICS & GYNECOLOGY

## 2019-12-12 PROCEDURE — 87491 CHLMYD TRACH DNA AMP PROBE: CPT | Performed by: OBSTETRICS & GYNECOLOGY

## 2019-12-12 PROCEDURE — 99395 PREV VISIT EST AGE 18-39: CPT | Performed by: OBSTETRICS & GYNECOLOGY

## 2019-12-13 LAB
C TRACH RRNA SPEC QL NAA+PROBE: NEGATIVE
N GONORRHOEA RRNA SPEC QL NAA+PROBE: NEGATIVE
SPECIMEN SOURCE: NORMAL

## 2019-12-18 LAB — CYTOLOGY CVX/VAG DOC THIN PREP: NORMAL

## 2020-03-27 NOTE — TELEPHONE ENCOUNTER
Patient states she went to [facility] a couple weeks ago for stomach pain, it has gotten worse, she also has sinus pressure/swollen and headaches, please advise.

## 2020-05-19 NOTE — PATIENT INSTRUCTIONS
Coronavirus Disease 2019 (COVID-19): Caring for Yourself or Others  If you or a household member have symptoms of COVID-19, follow the guidelines below for preventing spread of the virus, and managing symptoms.   If you think you have COVID-19 symptoms  · If you have been diagnosed with COVID-19  · Stay home and start self-isolation. Don’t leave your home unless you need to get medical care. Don't go to work, school, or public areas. Don't use public transportation or taxis.   · Follow all instructions from · Getting rest. This helps your body fight the illness. · Staying hydrated. Drink 6 to 8 glasses of liquids every day, or as advised by your provider. Good choices are water, sport drinks, soft drinks without caffeine, juices, tea, and soup.   · Taking ove 3. It has been at least 7 days since your first symptoms started. Talk with your healthcare provider before you leave home. Tell him or her if the 3 things above are true for you. He or she may tell you it’s OK to leave home.  In some cases, your state or · Ask questions if anything is unclear to you. Write down answers so you remember them. Last modified date: 4/27/2020  Eun last reviewed this educational content on 4/1/2020  © 0008-3513 [employer] [address].  Yessi Bland [address] [place], [place]

## 2020-05-19 NOTE — TELEPHONE ENCOUNTER
Future Appointments   Date Time Provider Farhad Seaman   5/19/2020  1:30 PM Kwan Cortes DO [facility] [address] [facility] [address]     Patient verbally consents to a virtual/telephone check-in service for the date and time noted above.  Patient understands and accept

## 2020-05-19 NOTE — PROGRESS NOTES
VIRTUAL/TELEPHONE ENCOUNTER    Subjective    This visit is conducted using live telephone encounter with patient due to [facility] emergency.      Chief Complaint:  Patient presents with:  Fever        The patient confirmed knowledge of the limitations of the us testing    Plan    -advised symptomatic tx: push fluids, plenty of rest, nasal saline drops, keep humidifier on and tylenol 650mg q6h prn fevers  - covid testing ordered.    - instructed to notify if s/s worsen - fevers, respiratory distress, worsening diar

## 2020-05-21 NOTE — TELEPHONE ENCOUNTER
Spoke with pt, states her Covid test came back negative. She is still having symptoms.   +cough  +fatigue  +muscle/body aches  +chills  States she hasn't taken her temp today and doesn't have a thermometer handy  C/o continued nausea, states she vomited onc

## 2020-05-21 NOTE — TELEPHONE ENCOUNTER
Please advise her to not return to work, her test came back negative but it can be a false negative. She needs improvement of symptoms/7 days of quarantine before she returns. Can send CDC note if needed.

## 2020-05-21 NOTE — TELEPHONE ENCOUNTER
- Pt is still having flu symptoms. Covid test came back neg. Please advise as she is not feeling well and going to work.     [phone]

## 2020-05-22 NOTE — TELEPHONE ENCOUNTER
Spoke with pt, advised on symptomatic treatment. Advised her to take Tylenol every 6 hours, max dose 4000mg daily.  Pt states her fever usually spikes at night, advised that is normal. Advised pt she can get OTC delsym for cough or Mucinex if she is having

## 2020-05-27 NOTE — TELEPHONE ENCOUNTER
- Pt states she is still running a temp of 100.2. Pt also having chills, Body aches, shortness of breath and coughing.     Please advise [phone]

## 2020-05-27 NOTE — TELEPHONE ENCOUNTER
Patient had appt with Dr Екатерина Murray on 5/19/20   Covid test on 5/20/20 which was negative.  Please advise

## 2020-05-28 NOTE — PATIENT INSTRUCTIONS
Acute Bacterial Rhinosinusitis (ABRS)    Acute bacterial rhinosinusitis (ABRS) is an infection of your nasal cavity and sinuses. It’s caused by bacteria. Acute means that you’ve had symptoms for less than 4 weeks, but possibly up to 12 weeks.   Understand · Nasal corticosteroid medicine. Drops or spray used in the nose can lessen swelling and congestion. · Over-the-counter pain medicine. This is to lessen sinus pain and pressure. · Nasal decongestant medicine. Spray or drops may help to lessen congestion. You will be advised to stay at least 6 feet from others as much as possible. This is called \"social distancing. \" You may be advised to stay at home and isolate yourself as much as possible if COVID-19 is in your area.  You may hear terms such as \"self is · There is no evidence right now that animals spread SARS-CoV-2. But it's always a good idea to wash your hands after touching any animals. Don't touch animals that may be sick. · Don’t share eating or drinking utensils with sick people.     If you leave h · The CDC advises wearing a cloth face mask in public. During a public health emergency, medical face masks may be reserved for healthcare workers. You may need to make a cloth face mask of your own.  You can do this using a bandana, T-shirt, or other cloth · Watch for symptoms of the virus. Call your provider if you have symptoms. Call your provider first before going to any clinic or hospital. See the CDC's symptom . · Stay home if you are sick for any reason.   When to call your healthcare provider

## 2020-05-28 NOTE — PROGRESS NOTES
Virtual/Telephone Check-In    Zaira Pza verbally consents to a Virtual/Telephone Check-In service on 05/28/20. Patient understands and accepts financial responsibility for any deductible, co-insurance and/or co-pays associated with this service. EVERYDAY AT BEDTIME, Disp: , Rfl:   •  AZELASTINE  MCG/SPRAY Nasal Solution, SPRAY 1 SPRAY INTO EACH NOSTRIL TWICE A DAY, Disp: 30 mL, Rfl: 0  •  Levonorgest-Eth [address] 91-Day 0.15-0.03 MG Oral Tab, **9/17**TAKE 1 TABLET BY MOUTH EVERY DAY, Disp: ,

## 2020-05-28 NOTE — TELEPHONE ENCOUNTER
Future Appointments   Date Time Provider Farhad Seaman   5/28/2020  3:00 PM Francine De Anda,  [facility] [address]     Patient verbally consents to a virtual/telephone check-in service for the date and time noted above.  Patient understands and accept

## 2020-08-31 RX ORDER — LEVONORGESTREL AND ETHINYL ESTRADIOL 0.15-0.03
KIT ORAL
Qty: 91 TABLET | Refills: 1 | Status: SHIPPED | OUTPATIENT
Start: 2020-08-31 | End: 2021-03-01

## 2020-10-13 PROBLEM — R11.15 CYCLICAL VOMITING: Status: ACTIVE | Noted: 2020-10-13

## 2020-10-13 PROBLEM — R73.9 HYPERGLYCEMIA: Status: ACTIVE | Noted: 2020-10-13

## 2020-10-13 PROBLEM — E87.6 HYPOKALEMIA: Status: ACTIVE | Noted: 2020-10-13

## 2020-10-13 NOTE — PROGRESS NOTES
Pt admitted at this time from ER. Admission database completed. Alert and oriented x4. RA. VSS. Denies nausea at this time. Rating pain 3/10, denies need for pain medication. IV fluids per order. Voiding. Pt up ad shelby. Plan of care discussed with pt.  All q

## 2020-10-13 NOTE — ED NOTES
Pt continues to c/o abd cramping. Pt states nausea has improved. Pt belching. Rn gave patient ice chips. Rn on cardiac monitor.

## 2020-10-13 NOTE — ED NOTES
Dr. Fabiana Fortune discussed admitting patient to hospital with patient. Pt would like to finish 2nd liter of 0.9NS.

## 2020-10-13 NOTE — H&P
[facility]  History and Physical     Rivera Marine Patient Status:  Emergency    1995 MRN [id number]   Location [address] [facility] Attending Kushal Shipley MD   Hosp Day # 0 PCP Ash Fontanez DO     Chief Complai drink alcohol.     Family History:   Family History   Problem Relation Age of Onset   • Hypertension Father    • High Cholesterol Father    • Depression Father    • Cancer Paternal Grandfather    • Depression Mother    • Other (Other) Mother         UCTD No edema or cyanosis. Integument: No rashes or lesions. Psychiatric: Appropriate mood and affect.       Diagnostic Data:      Labs:  Recent Labs   Lab 10/13/20  1126   WBC 9.9   HGB 14.8   MCV 86.9   .0       Recent Labs   Lab 10/13/20  1126   GLU

## 2020-10-13 NOTE — ED INITIAL ASSESSMENT (HPI)
Pt aox4. Pt presents to ed from home. Pt c/o n/v started yesterday @ 7am. Pt unable to keep fluids down. Pt denies fever. Pt states has hx: cyclic vomiting.  Last episode was Sept 2019

## 2020-10-14 NOTE — PLAN OF CARE
Problem: Patient/Family Goals  Goal: Patient/Family Long Term Goal  Description: Patient's Long Term Goal: Discharge home     Interventions:  - Follow POC  - See additional Care Plan goals for specific interventions  Outcome: Progressing  Goal: Patient/F assistive devices as appropriate  - Consider OT/PT consult to assist with strengthening/mobility  - Encourage toileting schedule  Outcome: Progressing     Problem: DISCHARGE PLANNING  Goal: Discharge to home or other facility with appropriate resources  Vipul

## 2020-10-14 NOTE — PROGRESS NOTES
[facility]  Progress Note     Sanjana Party Patient Status:  Observation    1995 MRN [id number]   [facility] 0SW-A Attending Antoni Dia MD   Hosp Day # 0 PCP Stu Lakhani DO     Chief Complaint: n/v   S:  Feeling be eating disorder     Plan for DC later today if tolerating diet.        Nas Grewal MD

## 2020-10-14 NOTE — PLAN OF CARE
Pt was able to tolerate soft diet without any problems. Written and verbal discharge instructions given to patient and verbalize understanding.  IV discontinued in , angio-cath tip intact, site free from redness, swelling, or drainage, patient denies pain a

## 2020-10-15 NOTE — DISCHARGE SUMMARY
[facility]  DISCHARGE SUMMARY     Ion Spotted Patient Status:  Observation    1995 MRN [id number]   [facility] 0SW-A Attending No att. providers found   Hosp Day # 0 PCP Cely Pierre DO     Date of Admission: 10/13/2020 descriptions):  • Per Brief Synopsis of Hospital Course    Lab/Test results pending at Discharge:   · None    Consultants:  • None    Discharge Medication List:     Discharge Medications      START taking these medications      Instructions Prescription de

## 2020-10-20 NOTE — PROGRESS NOTES
Subjective    This visit is conducted using Telemedicine with live, interactive video and audio. Chief Complaint:  Patient presents with:   Follow - Up      The patient confirmed knowledge of the limitations of the use of telemedicine were verbally con numbness, tingling and headaches. Hematological: Negative for adenopathy. Does not bruise/bleed easily. Psychiatric/Behavioral: The patient is nervous/anxious. Therapies tired:  Acetaminophen, zofran     COVID-19 QUESTIONS - quick screening.    Cont Linda Trent, DO

## 2020-10-22 NOTE — PATIENT INSTRUCTIONS
Understanding Cyclic Vomiting Syndrome   Cyclic vomiting syndrome (CVS) is an uncommon health problem in adults. It’s when you have sudden, recurring episodes or attacks of vomiting. The attacks may occur over a period of a few days or weeks.  In between · Lifestyle changes. Trying to stay away from triggers such as stress or certain foods may help prevent symptoms.   Possible complications of cyclic vomiting syndrome  · Dehydration  · Irritated or damaged esophagus  · Tooth decay  · Migraine headaches    C

## 2020-11-10 NOTE — ED PROVIDER NOTES
Patient Seen in: [facility] Emergency Department      History   Patient presents with:  Nausea/Vomiting/Diarrhea    Stated Complaint: pt states \"I need to be admitted for cyclic vomiting\".      HPI    And is a pleasant 28-year-old female presenting Review of Systems    Positive for stated complaint: pt states \"I need to be admitted for cyclic vomiting\". Other systems are as noted in HPI. Constitutional and vital signs reviewed.       All other systems reviewed and negative except as noted a WBC Urine 21-50 (*)     RBC URINE 6-10 (*)     Squamous Epi.  Cells Large (*)     Mucous Urine 2+ (*)     All other components within normal limits   CBC W/ DIFFERENTIAL - Abnormal; Notable for the following components:    WBC 15.2 (*)     RBC 5.50 (*) Discharge Medication List    START taking these medications    potassium chloride 20 MEQ Oral Powd Pack  Take 20 mEq by mouth 2 (two) times daily for 3 days.   Qty: 6 packet Refills: 0    Metoclopramide HCl 10 MG Oral Tab  Take 1 tablet (10 mg total) by michelle

## 2020-11-10 NOTE — PROGRESS NOTES
Subjective    This visit is conducted using Telemedicine with live, interactive video and audio.     Chief Complaint:  Patient presents with:  Vomiting    The patient confirmed knowledge of the limitations of the use of telemedicine were verbally confirme EGD indicated.     PROCEDURE:  CT ABDOMEN+PELVIS (CONTRAST ONLY) (CPT=74177)     FINDINGS:    LIVER:  No enlargement, atrophy, abnormal density, or significant focal lesion. BILIARY:  No visible dilatation or calcification.     PANCREAS:  No lesion, flui to suspected recurrence of cyclic vomiting. Failed treatment with zofran and PPI. Symptoms worsening over the last 2 days.      Diagnostic rationale, follow up instructions, and strict precautions/indications for emergent direct evaluation were discussed wi

## 2020-11-10 NOTE — ED INITIAL ASSESSMENT (HPI)
Pt here for \"cyclic vomiting\" Pt unsure why she was dx with cyclic vomiting. Pt was advised to ED for further evaluation.

## 2020-11-10 NOTE — PROGRESS NOTES
[facility] Pharmacy Note:  Renal Adjustment for levofloxacin (Jayna Laser)    Sanjana Alvarez is a 23year old patient who has been prescribed levofloxacin (LEVAQUIN) 500 mg x1.  The dose has been adjusted to levofloxacin (LEVAQUIN) 750 mg x1 per hospital dose adjustm

## 2020-11-11 NOTE — TELEPHONE ENCOUNTER
Spoke to patient in regards to recent ER visit. Patient not feeling any better, and will call tomorrow morning to schedule video visit with PCP.

## 2020-11-11 NOTE — TELEPHONE ENCOUNTER
- Pt had [employer] call yesterday and would like to request a note for work. [employer] is employer. Pt is still vomiting today and would like a note from 11/09/2020 though the end of the week. Pt would return next Monday 11/16/2020 if feeling better.

## 2020-11-19 NOTE — TELEPHONE ENCOUNTER
From: Mark Lazaro  To:  Sarai Palma DO  Sent: 11/18/2020 6:10 PM CST  Subject: Visit Donel Vannesa De Luna, I just wanted to update you on how I've been doing; today marks the second day I've been finally able to keep solid food d

## 2020-11-19 NOTE — TELEPHONE ENCOUNTER
- Pt would like to return to work today if possible. Pt is able to keep food down for the past three days. Pt was out from 11/07/2020 to return today. Patient will print this from [address] [place] [address] [address]. [employer] Attn:Darshana  .

## 2021-03-01 RX ORDER — LEVONORGESTREL AND ETHINYL ESTRADIOL 0.15-0.03
KIT ORAL
Qty: 91 TABLET | Refills: 1 | Status: SHIPPED | OUTPATIENT
Start: 2021-03-01 | End: 2022-08-31 | Stop reason: ALTCHOICE

## 2021-03-31 NOTE — ED QUICK NOTES
Pt verbalized of feeling so thirsty. Requesting ice chips- given. Tolerated well. Up to the bathroom.

## 2021-03-31 NOTE — ED QUICK NOTES
Pt still feel nauseous, but no vomiting. States she has sore throat and chest discomfort. Attached on a cardiac monitor- showing NSR.  md aware. Ativan given. at bedside.

## 2021-03-31 NOTE — ED PROVIDER NOTES
Patient Seen in: THE [facility] Emergency Department In [place]      History   Patient presents with:  Nausea/Vomiting/Diarrhea    Stated Complaint: vomiting    HPI/Subjective:   HPI    Patient was to the emergency department about 4 months ago.   Patient has Social History    Tobacco Use      Smoking status: Former Smoker        Packs/day: 0.00        Types: Cigarettes        Quit date: 2019        Years since quittin.9      Smokeless tobacco: Never Used      Tobacco comment: 4-5/day    Vaping U Reviewed   COMP METABOLIC PANEL (14) - Abnormal; Notable for the following components:       Result Value    Glucose 136 (*)     Potassium 3.0 (*)     BUN 28 (*)     Creatinine 1.32 (*)     BUN/CREA Ratio 21.2 (*)     Calculated Osmolality 296 (*)     GFR, the future. Her abdominal examination is nonsurgical    Patient was treated with IV fluid normal saline followed by D5 LR  She received Reglan, Benadryl, and Zofran    CBC elevated similar to previous.   Hematocrit elevated likely related to hemoconcentrat

## 2021-04-14 NOTE — PROGRESS NOTES
HPI/Subjective:   Patient ID: Fabio Ventura is a 23year old female. HPI  Ms. Laura Segura is a 22-year-old female presented for video visit today for vomiting which started yesterday and has been vomiting since then.   She has history of migraines and depress 100 mg by mouth daily.          Allergies:  Albuterol               HIVES  Bupropion               HIVES  Cefdinir                HIVES  Loperamide              OTHER (SEE COMMENTS)    Comment:Tongue turns black             Tongue turns black             To

## 2021-04-14 NOTE — TELEPHONE ENCOUNTER
Patient's mother would like the 2 RX's from today to transfer to [facility] instead, chart updated, please advise.

## 2021-04-15 NOTE — ED QUICK NOTES
Rounding Completed    Plan of Care reviewed. Waiting for MD reassessment. Elimination needs assessed. Provided with warm blankets. Observed to be resting on cart, mother remains at bedside. No active vomiting observed. Reports pain decreased.      Bed is

## 2021-04-15 NOTE — ED QUICK NOTES
Patient has been provided with some water to drink and is tolerating do far without any episodes of vomiting. Will continue to assess.

## 2021-04-15 NOTE — ED INITIAL ASSESSMENT (HPI)
Patient reports she was sent to ED from GI office for work up for cyclic vomiting, last seen at [facility] about 2 weeks ago. Unsure of what is triggering symptoms. Sent to IV hydration. Admits to abdominal pain.  Actively vomiting upon arrival. Symptoms si

## 2021-04-16 NOTE — ED PROVIDER NOTES
Patient Seen in: [facility] Emergency Department      History   Patient presents with:  Nausea/Vomiting/Diarrhea    Stated Complaint: cyclic vomiting, vomiting x 3 days    HPI/Subjective:   HPI    Patient is a 44-year-old female history of cyclic vo Cannabis      Comment: 1 gm 3-4 times/week             Review of Systems    Positive for stated complaint: cyclic vomiting, vomiting x 3 days  Other systems are as noted in HPI. Constitutional and vital signs reviewed.       All other systems reviewed and limits   LIPASE - Normal   CBC WITH DIFFERENTIAL WITH PLATELET    Narrative: The following orders were created for panel order CBC WITH DIFFERENTIAL WITH PLATELET.   Procedure                               Abnormality         Status Clinical Impression:  Cyclic vomiting syndrome  (primary encounter diagnosis)     Disposition:  Discharge  4/15/2021  6:51 pm    Follow-up:  KAROLINA Mae/ Michael [address]  [phone]      As needed          Med

## 2021-08-10 ENCOUNTER — OFFICE VISIT (OUTPATIENT)
Dept: OBGYN | Age: 26
End: 2021-08-10

## 2021-08-10 VITALS
WEIGHT: 178.13 LBS | BODY MASS INDEX: 31.56 KG/M2 | HEIGHT: 63 IN | SYSTOLIC BLOOD PRESSURE: 124 MMHG | DIASTOLIC BLOOD PRESSURE: 78 MMHG | HEART RATE: 100 BPM

## 2021-08-10 DIAGNOSIS — Z11.3 ROUTINE SCREENING FOR STI (SEXUALLY TRANSMITTED INFECTION): Primary | ICD-10-CM

## 2021-08-10 PROBLEM — F43.10 PTSD (POST-TRAUMATIC STRESS DISORDER): Status: ACTIVE | Noted: 2021-08-10

## 2021-08-10 PROCEDURE — 99395 PREV VISIT EST AGE 18-39: CPT | Performed by: OBSTETRICS & GYNECOLOGY

## 2021-08-10 RX ORDER — HALOPERIDOL 1 MG/1
1 TABLET ORAL DAILY
COMMUNITY
Start: 2021-05-10 | End: 2022-08-31 | Stop reason: ALTCHOICE

## 2021-08-10 RX ORDER — NORETHINDRONE ACETATE AND ETHINYL ESTRADIOL .03; 1.5 MG/1; MG/1
1 TABLET ORAL DAILY
Qty: 90 TABLET | Refills: 4 | Status: SHIPPED | OUTPATIENT
Start: 2021-08-10 | End: 2022-08-31 | Stop reason: ALTCHOICE

## 2021-08-10 RX ORDER — PANTOPRAZOLE SODIUM 40 MG/1
TABLET, DELAYED RELEASE ORAL
COMMUNITY
End: 2022-08-31 | Stop reason: ALTCHOICE

## 2021-08-10 RX ORDER — ARIPIPRAZOLE 15 MG/1
TABLET ORAL
COMMUNITY
Start: 2021-05-30 | End: 2022-08-31 | Stop reason: ALTCHOICE

## 2021-08-10 RX ORDER — UREA 10 %
LOTION (ML) TOPICAL
COMMUNITY
End: 2022-08-31 | Stop reason: ALTCHOICE

## 2021-08-10 ASSESSMENT — PATIENT HEALTH QUESTIONNAIRE - PHQ9
2. FEELING DOWN, DEPRESSED OR HOPELESS: NOT AT ALL
SUM OF ALL RESPONSES TO PHQ9 QUESTIONS 1 AND 2: 0
CLINICAL INTERPRETATION OF PHQ9 SCORE: NO FURTHER SCREENING NEEDED
SUM OF ALL RESPONSES TO PHQ9 QUESTIONS 1 AND 2: 0
1. LITTLE INTEREST OR PLEASURE IN DOING THINGS: NOT AT ALL
CLINICAL INTERPRETATION OF PHQ2 SCORE: NO FURTHER SCREENING NEEDED

## 2021-11-29 RX ORDER — LEVONORGESTREL AND ETHINYL ESTRADIOL 0.15-0.03
KIT ORAL
Qty: 91 TABLET | Refills: 1 | OUTPATIENT
Start: 2021-11-29

## 2022-08-31 ENCOUNTER — OFFICE VISIT (OUTPATIENT)
Dept: OBGYN | Age: 27
End: 2022-08-31

## 2022-08-31 VITALS
HEART RATE: 85 BPM | DIASTOLIC BLOOD PRESSURE: 69 MMHG | WEIGHT: 188.1 LBS | SYSTOLIC BLOOD PRESSURE: 101 MMHG | HEIGHT: 63 IN | BODY MASS INDEX: 33.33 KG/M2

## 2022-08-31 DIAGNOSIS — Z11.3 ROUTINE SCREENING FOR STI (SEXUALLY TRANSMITTED INFECTION): ICD-10-CM

## 2022-08-31 DIAGNOSIS — Z01.419 ENCOUNTER FOR ROUTINE GYNECOLOGICAL EXAMINATION WITH PAPANICOLAOU SMEAR OF CERVIX: Primary | ICD-10-CM

## 2022-08-31 DIAGNOSIS — Z11.51 SCREENING FOR HPV (HUMAN PAPILLOMAVIRUS): ICD-10-CM

## 2022-08-31 PROCEDURE — 87491 CHLMYD TRACH DNA AMP PROBE: CPT | Performed by: INTERNAL MEDICINE

## 2022-08-31 PROCEDURE — 88175 CYTOPATH C/V AUTO FLUID REDO: CPT | Performed by: INTERNAL MEDICINE

## 2022-08-31 PROCEDURE — 99395 PREV VISIT EST AGE 18-39: CPT | Performed by: OBSTETRICS & GYNECOLOGY

## 2022-08-31 PROCEDURE — 87591 N.GONORRHOEAE DNA AMP PROB: CPT | Performed by: INTERNAL MEDICINE

## 2022-08-31 ASSESSMENT — PATIENT HEALTH QUESTIONNAIRE - PHQ9
1. LITTLE INTEREST OR PLEASURE IN DOING THINGS: NOT AT ALL
2. FEELING DOWN, DEPRESSED OR HOPELESS: NOT AT ALL
CLINICAL INTERPRETATION OF PHQ2 SCORE: NO FURTHER SCREENING NEEDED
SUM OF ALL RESPONSES TO PHQ9 QUESTIONS 1 AND 2: 0
SUM OF ALL RESPONSES TO PHQ9 QUESTIONS 1 AND 2: 0

## 2022-09-01 LAB
C TRACH RRNA SPEC QL NAA+PROBE: NEGATIVE
Lab: NORMAL
N GONORRHOEA RRNA SPEC QL NAA+PROBE: NEGATIVE

## 2022-09-02 LAB — HOLD SPECIMEN: NORMAL

## 2022-09-06 LAB
CASE RPRT: NORMAL
CLINICAL INFO: NORMAL
CYTOLOGY CVX/VAG STUDY: NORMAL
PAP EDUCATIONAL NOTE: NORMAL
SPECIMEN ADEQUACY: NORMAL

## 2022-09-07 ENCOUNTER — TELEPHONE (OUTPATIENT)
Dept: OBGYN | Age: 27
End: 2022-09-07

## 2022-11-10 ENCOUNTER — OFFICE VISIT (OUTPATIENT)
Dept: OBGYN | Age: 27
End: 2022-11-10

## 2022-11-10 VITALS
BODY MASS INDEX: 33.3 KG/M2 | SYSTOLIC BLOOD PRESSURE: 115 MMHG | DIASTOLIC BLOOD PRESSURE: 75 MMHG | HEART RATE: 97 BPM | WEIGHT: 187.94 LBS | HEIGHT: 63 IN

## 2022-11-10 DIAGNOSIS — N91.1 SECONDARY AMENORRHEA: Primary | ICD-10-CM

## 2022-11-10 DIAGNOSIS — Z32.2 ENCOUNTER FOR CHILDBIRTH INSTRUCTION: ICD-10-CM

## 2022-11-10 PROBLEM — E55.9 VITAMIN D DEFICIENCY: Status: RESOLVED | Noted: 2019-12-12 | Resolved: 2022-11-10

## 2022-11-10 PROBLEM — F39 MOOD DISORDER (CMD): Status: RESOLVED | Noted: 2019-09-05 | Resolved: 2022-11-10

## 2022-11-10 LAB
B-HCG UR QL: POSITIVE
INTERNAL PROCEDURAL CONTROLS ACCEPTABLE: YES

## 2022-11-10 PROCEDURE — 99215 OFFICE O/P EST HI 40 MIN: CPT | Performed by: OBSTETRICS & GYNECOLOGY

## 2022-11-10 PROCEDURE — 81025 URINE PREGNANCY TEST: CPT | Performed by: OBSTETRICS & GYNECOLOGY

## 2022-11-21 ENCOUNTER — LAB SERVICES (OUTPATIENT)
Dept: LAB | Age: 27
End: 2022-11-21

## 2022-11-21 ENCOUNTER — FIRST OB VISIT (OUTPATIENT)
Dept: OBGYN | Age: 27
End: 2022-11-21

## 2022-11-21 VITALS
HEART RATE: 102 BPM | SYSTOLIC BLOOD PRESSURE: 125 MMHG | HEIGHT: 63 IN | DIASTOLIC BLOOD PRESSURE: 82 MMHG | BODY MASS INDEX: 33.24 KG/M2 | WEIGHT: 187.61 LBS

## 2022-11-21 DIAGNOSIS — Z01.83 ENCOUNTER FOR BLOOD TYPING: ICD-10-CM

## 2022-11-21 DIAGNOSIS — F19.11 HISTORY OF SUBSTANCE ABUSE (CMD): ICD-10-CM

## 2022-11-21 DIAGNOSIS — Z13.0 SCREENING, ANEMIA, DEFICIENCY, IRON: ICD-10-CM

## 2022-11-21 DIAGNOSIS — Z11.4 ENCOUNTER FOR SCREENING FOR HIV: ICD-10-CM

## 2022-11-21 DIAGNOSIS — Z11.59 SCREENING EXAMINATION FOR RUBELLA: ICD-10-CM

## 2022-11-21 DIAGNOSIS — Z11.59 NEED FOR HEPATITIS B SCREENING TEST: ICD-10-CM

## 2022-11-21 DIAGNOSIS — E66.9 OBESITY, UNSPECIFIED CLASSIFICATION, UNSPECIFIED OBESITY TYPE, UNSPECIFIED WHETHER SERIOUS COMORBIDITY PRESENT: ICD-10-CM

## 2022-11-21 DIAGNOSIS — Z11.3 ROUTINE SCREENING FOR STI (SEXUALLY TRANSMITTED INFECTION): ICD-10-CM

## 2022-11-21 DIAGNOSIS — Z34.01 OBSTETRICAL VISIT FOR FIRST PREGNANCY, FIRST TRIMESTER: ICD-10-CM

## 2022-11-21 DIAGNOSIS — Z34.01 OBSTETRICAL VISIT FOR FIRST PREGNANCY, FIRST TRIMESTER: Primary | ICD-10-CM

## 2022-11-21 DIAGNOSIS — E66.9 OBESITY (BMI 30-39.9): ICD-10-CM

## 2022-11-21 PROBLEM — O26.899 RH NEGATIVE STATE IN ANTEPARTUM PERIOD: Status: ACTIVE | Noted: 2022-11-21

## 2022-11-21 PROBLEM — Z67.91 RH NEGATIVE STATE IN ANTEPARTUM PERIOD: Status: ACTIVE | Noted: 2022-11-21

## 2022-11-21 LAB
ABO + RH BLD: NORMAL
APPEARANCE, POC: CLEAR
BASOPHILS # BLD: 0 K/MCL (ref 0–0.3)
BASOPHILS NFR BLD: 1 %
BILIRUBIN, POC: NEGATIVE
COLOR, POC: YELLOW
DEPRECATED RDW RBC: 39.5 FL (ref 39–50)
EOSINOPHIL # BLD: 0.1 K/MCL (ref 0–0.5)
EOSINOPHIL NFR BLD: 1 %
ERYTHROCYTE [DISTWIDTH] IN BLOOD: 12.5 % (ref 11–15)
GLUCOSE UR-MCNC: NEGATIVE MG/DL
HCT VFR BLD CALC: 38.4 % (ref 36–46.5)
HGB BLD-MCNC: 13.1 G/DL (ref 12–15.5)
IMM GRANULOCYTES # BLD AUTO: 0 K/MCL (ref 0–0.2)
IMM GRANULOCYTES # BLD: 0 %
KETONES, POC: NEGATIVE MG/DL
LYMPHOCYTES # BLD: 1.9 K/MCL (ref 1–4.8)
LYMPHOCYTES NFR BLD: 26 %
MCH RBC QN AUTO: 29.5 PG (ref 26–34)
MCHC RBC AUTO-ENTMCNC: 34.1 G/DL (ref 32–36.5)
MCV RBC AUTO: 86.5 FL (ref 78–100)
MONOCYTES # BLD: 0.7 K/MCL (ref 0.3–0.9)
MONOCYTES NFR BLD: 9 %
NEUTROPHILS # BLD: 4.5 K/MCL (ref 1.8–7.7)
NEUTROPHILS NFR BLD: 63 %
NITRITE, POC: NEGATIVE
NRBC BLD MANUAL-RTO: 0 /100 WBC
OCCULT BLOOD, POC: NEGATIVE
PH UR: 6.5 [PH] (ref 5–7)
PLATELET # BLD AUTO: 278 K/MCL (ref 140–450)
PROT UR-MCNC: NEGATIVE MG/DL
RBC # BLD: 4.44 MIL/MCL (ref 4–5.2)
SP GR UR: 1.02 (ref 1–1.03)
UROBILINOGEN UR-MCNC: 0.2 MG/DL (ref 0–1)
WBC # BLD: 7.2 K/MCL (ref 4.2–11)
WBC (LEUKOCYTE) ESTERASE, POC: ABNORMAL

## 2022-11-21 PROCEDURE — 87591 N.GONORRHOEAE DNA AMP PROB: CPT | Performed by: INTERNAL MEDICINE

## 2022-11-21 PROCEDURE — 76830 TRANSVAGINAL US NON-OB: CPT | Performed by: OBSTETRICS & GYNECOLOGY

## 2022-11-21 PROCEDURE — 36415 COLL VENOUS BLD VENIPUNCTURE: CPT | Performed by: INTERNAL MEDICINE

## 2022-11-21 PROCEDURE — 83036 HEMOGLOBIN GLYCOSYLATED A1C: CPT | Performed by: INTERNAL MEDICINE

## 2022-11-21 PROCEDURE — 87491 CHLMYD TRACH DNA AMP PROBE: CPT | Performed by: INTERNAL MEDICINE

## 2022-11-21 PROCEDURE — 0500F INITIAL PRENATAL CARE VISIT: CPT | Performed by: OBSTETRICS & GYNECOLOGY

## 2022-11-21 PROCEDURE — 87086 URINE CULTURE/COLONY COUNT: CPT | Performed by: INTERNAL MEDICINE

## 2022-11-21 PROCEDURE — 86803 HEPATITIS C AB TEST: CPT | Performed by: INTERNAL MEDICINE

## 2022-11-21 PROCEDURE — 86870 RBC ANTIBODY IDENTIFICATION: CPT | Performed by: INTERNAL MEDICINE

## 2022-11-21 PROCEDURE — 80081 OBSTETRIC PANEL INC HIV TSTG: CPT | Performed by: INTERNAL MEDICINE

## 2022-11-21 PROCEDURE — 80307 DRUG TEST PRSMV CHEM ANLYZR: CPT | Performed by: INTERNAL MEDICINE

## 2022-11-22 ENCOUNTER — CLINICAL ABSTRACT (OUTPATIENT)
Dept: MATERNAL FETAL MEDICINE | Age: 27
End: 2022-11-22

## 2022-11-22 DIAGNOSIS — Z86.59 HISTORY OF DEPRESSION: Primary | ICD-10-CM

## 2022-11-22 DIAGNOSIS — O99.119 FACTOR V LEIDEN MUTATION COMPLICATING PREGNANCY (CMD): Primary | ICD-10-CM

## 2022-11-22 DIAGNOSIS — R03.0 ELEVATED BLOOD PRESSURE READING: ICD-10-CM

## 2022-11-22 DIAGNOSIS — Z36.9 1ST TRIMESTER SCREENING: ICD-10-CM

## 2022-11-22 DIAGNOSIS — D68.51 FACTOR V LEIDEN MUTATION COMPLICATING PREGNANCY (CMD): Primary | ICD-10-CM

## 2022-11-22 DIAGNOSIS — Z36.3 ENCOUNTER FOR ANTENATAL SCREENING FOR MALFORMATION: ICD-10-CM

## 2022-11-22 LAB
AMPHETAMINES UR QL SCN>500 NG/ML: NEGATIVE
BARBITURATES UR QL SCN>200 NG/ML: NEGATIVE
BENZODIAZ UR QL SCN>200 NG/ML: NEGATIVE
BLD GP AB INVEST PLASRBC-IMP: NORMAL
BLD GP AB SCN SERPL QL GEL: POSITIVE
BLD GP AB SCN SERPL QL PEG: NEGATIVE
BZE UR QL SCN>150 NG/ML: NEGATIVE
C TRACH RRNA UR QL NAA+PROBE: NEGATIVE
CANNABINOIDS UR QL SCN>50 NG/ML: NEGATIVE
HBA1C MFR BLD: 5.2 % (ref 4.5–5.6)
HBV SURFACE AG SER QL: NEGATIVE
HCV AB SER QL: NEGATIVE
HIV 1+2 AB+HIV1 P24 AG SERPL QL IA: NONREACTIVE
Lab: NORMAL
N GONORRHOEA RRNA UR QL NAA+PROBE: NEGATIVE
OPIATES UR QL SCN>300 NG/ML: NEGATIVE
PCP UR QL SCN>25 NG/ML: NEGATIVE
RPR SER QL: NONREACTIVE
RUBV IGG SERPL IA-ACNC: 155.6 UNITS/ML

## 2022-11-23 ENCOUNTER — TELEPHONE (OUTPATIENT)
Dept: MATERNAL FETAL MEDICINE | Age: 27
End: 2022-11-23

## 2022-11-23 ENCOUNTER — TELEPHONE (OUTPATIENT)
Dept: INTERNAL MEDICINE | Age: 27
End: 2022-11-23

## 2022-11-23 LAB — BACTERIA UR CULT: NORMAL

## 2022-11-28 ENCOUNTER — TELEPHONE (OUTPATIENT)
Dept: OBGYN | Age: 27
End: 2022-11-28

## 2022-11-28 DIAGNOSIS — Z13.79 OTHER GENETIC SCREENING: ICD-10-CM

## 2022-11-28 DIAGNOSIS — Z34.01 ENCOUNTER FOR SUPERVISION OF NORMAL FIRST PREGNANCY IN FIRST TRIMESTER: Primary | ICD-10-CM

## 2022-11-28 DIAGNOSIS — Z13.228 SCREENING FOR CYSTIC FIBROSIS: ICD-10-CM

## 2022-12-15 ENCOUNTER — TELEPHONE (OUTPATIENT)
Dept: MATERNAL FETAL MEDICINE | Age: 27
End: 2022-12-15

## 2022-12-15 RX ORDER — ACETAMINOPHEN 500 MG
500 TABLET ORAL EVERY 6 HOURS PRN
Status: ON HOLD | COMMUNITY
End: 2023-07-15 | Stop reason: HOSPADM

## 2022-12-21 ENCOUNTER — APPOINTMENT (OUTPATIENT)
Dept: MATERNAL FETAL MEDICINE | Age: 27
End: 2022-12-21
Attending: OBSTETRICS & GYNECOLOGY

## 2022-12-21 ENCOUNTER — OFFICE VISIT (OUTPATIENT)
Dept: MATERNAL FETAL MEDICINE | Age: 27
End: 2022-12-21
Attending: OBSTETRICS & GYNECOLOGY

## 2022-12-21 ENCOUNTER — EXTERNAL LAB (OUTPATIENT)
Dept: OTHER | Age: 27
End: 2022-12-21

## 2022-12-21 ENCOUNTER — OB CHECK (OUTPATIENT)
Dept: OBGYN | Age: 27
End: 2022-12-21

## 2022-12-21 ENCOUNTER — HOSPITAL ENCOUNTER (OUTPATIENT)
Dept: LAB | Age: 27
Discharge: HOME | End: 2022-12-21
Attending: OBSTETRICS & GYNECOLOGY

## 2022-12-21 VITALS
BODY MASS INDEX: 32.34 KG/M2 | HEIGHT: 63 IN | HEART RATE: 95 BPM | DIASTOLIC BLOOD PRESSURE: 69 MMHG | SYSTOLIC BLOOD PRESSURE: 106 MMHG | WEIGHT: 182.54 LBS

## 2022-12-21 VITALS
HEIGHT: 63 IN | BODY MASS INDEX: 32.43 KG/M2 | SYSTOLIC BLOOD PRESSURE: 112 MMHG | DIASTOLIC BLOOD PRESSURE: 68 MMHG | WEIGHT: 183 LBS

## 2022-12-21 DIAGNOSIS — Z13.228 SCREENING FOR CYSTIC FIBROSIS: ICD-10-CM

## 2022-12-21 DIAGNOSIS — F41.8 DEPRESSION WITH ANXIETY: ICD-10-CM

## 2022-12-21 DIAGNOSIS — Z86.59 HISTORY OF DEPRESSION: ICD-10-CM

## 2022-12-21 DIAGNOSIS — Z34.01 ENCOUNTER FOR SUPERVISION OF NORMAL FIRST PREGNANCY IN FIRST TRIMESTER: ICD-10-CM

## 2022-12-21 DIAGNOSIS — Z36.9 1ST TRIMESTER SCREENING: Primary | ICD-10-CM

## 2022-12-21 DIAGNOSIS — D68.51 FACTOR V LEIDEN CARRIER (CMD): ICD-10-CM

## 2022-12-21 DIAGNOSIS — R03.0 ELEVATED BLOOD PRESSURE READING: ICD-10-CM

## 2022-12-21 DIAGNOSIS — Z36.0 ENCOUNTER FOR ANTENATAL SCREENING FOR CHROMOSOMAL ANOMALIES: Primary | ICD-10-CM

## 2022-12-21 DIAGNOSIS — Z13.79 OTHER GENETIC SCREENING: ICD-10-CM

## 2022-12-21 DIAGNOSIS — O26.899 RH NEGATIVE STATE IN ANTEPARTUM PERIOD: Primary | ICD-10-CM

## 2022-12-21 DIAGNOSIS — Z67.91 RH NEGATIVE STATE IN ANTEPARTUM PERIOD: Primary | ICD-10-CM

## 2022-12-21 DIAGNOSIS — E66.9 OBESITY (BMI 30-39.9): Primary | ICD-10-CM

## 2022-12-21 DIAGNOSIS — E66.9 OBESITY (BMI 30-39.9): ICD-10-CM

## 2022-12-21 PROBLEM — Z28.21 INFLUENZA VACCINE REFUSED: Status: ACTIVE | Noted: 2022-12-21

## 2022-12-21 LAB
BKR KIT TESTING DISCLAIMER STATEMENT: NORMAL
PANORAMA  PRENATAL SCREEN SUMMARY: NORMAL

## 2022-12-21 PROCEDURE — 76801 OB US < 14 WKS SINGLE FETUS: CPT

## 2022-12-21 PROCEDURE — 36415 COLL VENOUS BLD VENIPUNCTURE: CPT | Performed by: OBSTETRICS & GYNECOLOGY

## 2022-12-21 PROCEDURE — 81329 SMN1 GENE DOS/DELETION ALYS: CPT | Performed by: OBSTETRICS & GYNECOLOGY

## 2022-12-21 PROCEDURE — 76813 OB US NUCHAL MEAS 1 GEST: CPT | Performed by: OBSTETRICS & GYNECOLOGY

## 2022-12-21 PROCEDURE — 81220 CFTR GENE COM VARIANTS: CPT | Performed by: OBSTETRICS & GYNECOLOGY

## 2022-12-21 PROCEDURE — 76801 OB US < 14 WKS SINGLE FETUS: CPT | Performed by: OBSTETRICS & GYNECOLOGY

## 2022-12-21 PROCEDURE — 81243 FMR1 GEN ALY DETC ABNL ALLEL: CPT | Performed by: OBSTETRICS & GYNECOLOGY

## 2022-12-21 PROCEDURE — 76813 OB US NUCHAL MEAS 1 GEST: CPT

## 2022-12-21 PROCEDURE — 99213 OFFICE O/P EST LOW 20 MIN: CPT | Performed by: OBSTETRICS & GYNECOLOGY

## 2022-12-21 PROCEDURE — 0502F SUBSEQUENT PRENATAL CARE: CPT | Performed by: OBSTETRICS & GYNECOLOGY

## 2022-12-27 LAB
GEN STRUCT VAR COPY NUM: NORMAL
GENE DIS ANL CARRIER INTERP BLD/T-IMP: NORMAL
LINKED VARIANT: NORMAL
SERVICE CMNT-IMP: NORMAL
SMN1 COPY NUMBER: 2
SMN1+SMN2 GENE MUT ANL BLD/T: NORMAL
SMN2 COPY NUM ENTNUM BLD/T: 2

## 2022-12-29 LAB
DIAGNOSTIC IMP SPEC-IMP: NORMAL
FMR1 ALLELE 2 CGG RPT ENTNUM BLD/T: 20 CGG REPEATS
FMR1 ALLELE 2 CGG RPT ENTNUM BLD/T: 29 CGG REPEATS
FMR1 GENE MUT ANL BLD/T: NORMAL
SERVICE CMNT-IMP: NORMAL

## 2022-12-30 LAB
CFTR ALLELE 1 BLD/T QL: ABNORMAL
CFTR ALLELE 2 BLD/T QL: ABNORMAL
CFTR MUT ANL BLD/T: ABNORMAL
CFTR MUT ANL BLD/T: ABNORMAL

## 2023-01-03 ENCOUNTER — TELEPHONE (OUTPATIENT)
Dept: MATERNAL FETAL MEDICINE | Age: 28
End: 2023-01-03

## 2023-01-04 PROBLEM — E84.9 CF (CYSTIC FIBROSIS) (CMD): Status: ACTIVE | Noted: 2023-01-04

## 2023-01-06 ENCOUNTER — TELEPHONE (OUTPATIENT)
Dept: OBGYN | Age: 28
End: 2023-01-06

## 2023-01-06 NOTE — ED INITIAL ASSESSMENT (HPI)
Here for johny and elevated heart rate that started yesterday, pt is aprox 14 weeks pregnant and her ob advised her to come in for evaluation

## 2023-01-06 NOTE — DISCHARGE INSTRUCTIONS
Follow-up with your primary care physician, for outpatient Holter monitoring, follow-up with cardiology. .  If you have any severe chest pain or shortness of breath you need to return to the emergency room. You were seen in the emergency room in a limited time. There is a possibility that although we do not see any acute process at this present time that things can change with time. Is therefore imperative that you follow-up with primary care physician for close follow-up. If there is any significant progression of your pain  or other symptoms you to return immediately to the emergency room.

## 2023-01-19 ENCOUNTER — LAB SERVICES (OUTPATIENT)
Dept: LAB | Age: 28
End: 2023-01-19

## 2023-01-19 ENCOUNTER — OB CHECK (OUTPATIENT)
Dept: OBGYN | Age: 28
End: 2023-01-19

## 2023-01-19 VITALS
HEART RATE: 98 BPM | DIASTOLIC BLOOD PRESSURE: 70 MMHG | SYSTOLIC BLOOD PRESSURE: 120 MMHG | BODY MASS INDEX: 32.46 KG/M2 | WEIGHT: 183.2 LBS | HEIGHT: 63 IN

## 2023-01-19 DIAGNOSIS — Z36.1 SCREENING FOR RAISED ALPHA-FETOPROTEIN LEVELS IN AMNIOTIC FLUID: ICD-10-CM

## 2023-01-19 DIAGNOSIS — Z34.02 ENCOUNTER FOR SUPERVISION OF NORMAL FIRST PREGNANCY IN SECOND TRIMESTER: ICD-10-CM

## 2023-01-19 DIAGNOSIS — E66.9 OBESITY (BMI 30-39.9): ICD-10-CM

## 2023-01-19 DIAGNOSIS — Z34.02 ENCOUNTER FOR SUPERVISION OF NORMAL FIRST PREGNANCY IN SECOND TRIMESTER: Primary | ICD-10-CM

## 2023-01-19 DIAGNOSIS — D68.51 FACTOR V LEIDEN CARRIER (CMD): ICD-10-CM

## 2023-01-19 PROCEDURE — 36415 COLL VENOUS BLD VENIPUNCTURE: CPT | Performed by: INTERNAL MEDICINE

## 2023-01-19 PROCEDURE — 0502F SUBSEQUENT PRENATAL CARE: CPT | Performed by: OBSTETRICS & GYNECOLOGY

## 2023-01-19 PROCEDURE — 82105 ALPHA-FETOPROTEIN SERUM: CPT | Performed by: INTERNAL MEDICINE

## 2023-01-20 ENCOUNTER — APPOINTMENT (OUTPATIENT)
Dept: OBGYN | Age: 28
End: 2023-01-20

## 2023-01-20 LAB
# FETUSES US: NORMAL
AFP MOM SERPL: 1.57 MOM
AFP SERPL-MCNC: 48.4 NG/ML
AGE AT DELIVERY: 25.77
GA: NORMAL WK
HX OF TRISOMY 21 NARR: NO
IDDM PATIENT QL: NO
METHOD BEST OVERALL GA ESTIMATE: NORMAL
NEURAL TUBE DEFECT RISK FETUS: NORMAL %
SERVICE CMNT-IMP: NORMAL
SERVICE CMNT-IMP: NORMAL
TS 21 RISK FETUS: NORMAL

## 2023-02-17 ENCOUNTER — OB CHECK (OUTPATIENT)
Dept: OBGYN | Age: 28
End: 2023-02-17

## 2023-02-17 VITALS
SYSTOLIC BLOOD PRESSURE: 114 MMHG | BODY MASS INDEX: 32.41 KG/M2 | WEIGHT: 182.98 LBS | HEART RATE: 91 BPM | OXYGEN SATURATION: 99 % | RESPIRATION RATE: 17 BRPM | DIASTOLIC BLOOD PRESSURE: 69 MMHG

## 2023-02-17 DIAGNOSIS — Z34.02 ENCOUNTER FOR SUPERVISION OF NORMAL FIRST PREGNANCY IN SECOND TRIMESTER: ICD-10-CM

## 2023-02-17 DIAGNOSIS — Z67.91 RH NEGATIVE STATE IN ANTEPARTUM PERIOD: Primary | ICD-10-CM

## 2023-02-17 DIAGNOSIS — O26.899 RH NEGATIVE STATE IN ANTEPARTUM PERIOD: Primary | ICD-10-CM

## 2023-02-17 PROCEDURE — 0502F SUBSEQUENT PRENATAL CARE: CPT | Performed by: OBSTETRICS & GYNECOLOGY

## 2023-02-17 ASSESSMENT — PAIN SCALES - GENERAL: PAINLEVEL: 0

## 2023-02-22 ENCOUNTER — OFFICE VISIT (OUTPATIENT)
Dept: MATERNAL FETAL MEDICINE | Age: 28
End: 2023-02-22
Attending: OBSTETRICS & GYNECOLOGY

## 2023-02-22 DIAGNOSIS — Z86.59 HISTORY OF DEPRESSION: ICD-10-CM

## 2023-02-22 DIAGNOSIS — R03.0 ELEVATED BLOOD PRESSURE READING: ICD-10-CM

## 2023-02-22 DIAGNOSIS — Z36.3 ENCOUNTER FOR ANTENATAL SCREENING FOR MALFORMATION: Primary | ICD-10-CM

## 2023-02-22 PROCEDURE — 76805 OB US >/= 14 WKS SNGL FETUS: CPT | Performed by: RADIOLOGY

## 2023-02-22 PROCEDURE — 76811 OB US DETAILED SNGL FETUS: CPT

## 2023-02-24 ENCOUNTER — CLINICAL DOCUMENTATION (OUTPATIENT)
Dept: OBGYN | Age: 28
End: 2023-02-24

## 2023-03-09 ENCOUNTER — EXTERNAL RECORD (OUTPATIENT)
Dept: HEALTH INFORMATION MANAGEMENT | Facility: OTHER | Age: 28
End: 2023-03-09

## 2023-03-17 ENCOUNTER — OB CHECK (OUTPATIENT)
Dept: OBGYN | Age: 28
End: 2023-03-17

## 2023-03-17 VITALS
WEIGHT: 184.19 LBS | DIASTOLIC BLOOD PRESSURE: 71 MMHG | SYSTOLIC BLOOD PRESSURE: 112 MMHG | RESPIRATION RATE: 17 BRPM | HEART RATE: 84 BPM | BODY MASS INDEX: 32.63 KG/M2 | OXYGEN SATURATION: 98 %

## 2023-03-17 DIAGNOSIS — Z67.91 RH NEGATIVE STATE IN ANTEPARTUM PERIOD: Primary | ICD-10-CM

## 2023-03-17 DIAGNOSIS — O26.899 RH NEGATIVE STATE IN ANTEPARTUM PERIOD: Primary | ICD-10-CM

## 2023-03-17 DIAGNOSIS — Z34.02 ENCOUNTER FOR SUPERVISION OF NORMAL FIRST PREGNANCY IN SECOND TRIMESTER: ICD-10-CM

## 2023-03-17 PROCEDURE — 82950 GLUCOSE TEST: CPT | Performed by: INTERNAL MEDICINE

## 2023-03-17 PROCEDURE — 0502F SUBSEQUENT PRENATAL CARE: CPT | Performed by: OBSTETRICS & GYNECOLOGY

## 2023-03-17 PROCEDURE — 85018 HEMOGLOBIN: CPT | Performed by: INTERNAL MEDICINE

## 2023-03-17 PROCEDURE — 85014 HEMATOCRIT: CPT | Performed by: INTERNAL MEDICINE

## 2023-03-17 PROCEDURE — 36415 COLL VENOUS BLD VENIPUNCTURE: CPT | Performed by: OBSTETRICS & GYNECOLOGY

## 2023-03-17 ASSESSMENT — PAIN SCALES - GENERAL: PAINLEVEL: 0

## 2023-03-18 LAB
GLUCOSE 1H P 50 G GLC PO SERPL-MCNC: 154 MG/DL (ref 65–139)
HCT VFR BLD CALC: 33.8 % (ref 36–46.5)
HGB BLD-MCNC: 11.4 G/DL (ref 12–15.5)

## 2023-03-19 DIAGNOSIS — O99.810 ABNORMAL GLUCOSE IN PREGNANCY, ANTEPARTUM: Primary | ICD-10-CM

## 2023-03-21 DIAGNOSIS — O99.810 ABNORMAL GLUCOSE IN PREGNANCY, ANTEPARTUM: Primary | ICD-10-CM

## 2023-04-01 ENCOUNTER — LAB SERVICES (OUTPATIENT)
Dept: LAB | Age: 28
End: 2023-04-01

## 2023-04-01 DIAGNOSIS — O99.810 ABNORMAL GLUCOSE IN PREGNANCY, ANTEPARTUM: ICD-10-CM

## 2023-04-01 DIAGNOSIS — R73.01 ELEVATED FASTING GLUCOSE: Primary | ICD-10-CM

## 2023-04-01 LAB
FASTING DURATION TIME PATIENT: 11 HOURS (ref 0–999)
GLUCOSE 1H P 100 G GLC PO SERPL-MCNC: 149 MG/DL (ref 65–179)
GLUCOSE 2H P 100 G GLC PO SERPL-MCNC: 114 MG/DL (ref 65–154)
GLUCOSE 3H P 100 G GLC PO SERPL-MCNC: 103 MG/DL (ref 65–139)
GLUCOSE P FAST SERPL-MCNC: 95 MG/DL (ref 65–94)

## 2023-04-01 PROCEDURE — 82952 GTT-ADDED SAMPLES: CPT | Performed by: INTERNAL MEDICINE

## 2023-04-01 PROCEDURE — 82951 GLUCOSE TOLERANCE TEST (GTT): CPT | Performed by: INTERNAL MEDICINE

## 2023-04-01 PROCEDURE — 36415 COLL VENOUS BLD VENIPUNCTURE: CPT | Performed by: INTERNAL MEDICINE

## 2023-04-11 ENCOUNTER — TELEPHONE (OUTPATIENT)
Dept: OBGYN | Age: 28
End: 2023-04-11

## 2023-04-11 DIAGNOSIS — Z67.91 RH NEGATIVE STATE IN ANTEPARTUM PERIOD: Primary | ICD-10-CM

## 2023-04-11 DIAGNOSIS — Z34.03 ENCOUNTER FOR SUPERVISION OF NORMAL FIRST PREGNANCY IN THIRD TRIMESTER: ICD-10-CM

## 2023-04-11 DIAGNOSIS — O26.899 RH NEGATIVE STATE IN ANTEPARTUM PERIOD: Primary | ICD-10-CM

## 2023-04-12 ENCOUNTER — TELEPHONE (OUTPATIENT)
Dept: FAMILY MEDICINE | Age: 28
End: 2023-04-12

## 2023-04-12 ENCOUNTER — LAB SERVICES (OUTPATIENT)
Dept: LAB | Age: 28
End: 2023-04-12

## 2023-04-12 DIAGNOSIS — Z67.91 RH NEGATIVE STATE IN ANTEPARTUM PERIOD: ICD-10-CM

## 2023-04-12 DIAGNOSIS — R73.01 ELEVATED FASTING GLUCOSE: ICD-10-CM

## 2023-04-12 DIAGNOSIS — O26.899 RH NEGATIVE STATE IN ANTEPARTUM PERIOD: ICD-10-CM

## 2023-04-12 DIAGNOSIS — Z34.03 ENCOUNTER FOR SUPERVISION OF NORMAL FIRST PREGNANCY IN THIRD TRIMESTER: ICD-10-CM

## 2023-04-12 LAB
ABO + RH BLD: NORMAL
BLD GP AB SCN SERPL QL GEL: NEGATIVE
HBA1C MFR BLD: 4.8 % (ref 4.5–5.6)

## 2023-04-12 PROCEDURE — 36415 COLL VENOUS BLD VENIPUNCTURE: CPT | Performed by: OBSTETRICS & GYNECOLOGY

## 2023-04-12 PROCEDURE — 86900 BLOOD TYPING SEROLOGIC ABO: CPT | Performed by: INTERNAL MEDICINE

## 2023-04-12 PROCEDURE — 86901 BLOOD TYPING SEROLOGIC RH(D): CPT | Performed by: INTERNAL MEDICINE

## 2023-04-12 PROCEDURE — 85461 HEMOGLOBIN FETAL: CPT | Performed by: INTERNAL MEDICINE

## 2023-04-12 PROCEDURE — 83036 HEMOGLOBIN GLYCOSYLATED A1C: CPT | Performed by: INTERNAL MEDICINE

## 2023-04-12 PROCEDURE — 86850 RBC ANTIBODY SCREEN: CPT | Performed by: INTERNAL MEDICINE

## 2023-04-13 LAB
ABO + RH BLD: NORMAL
FETAL CELL SCN BLD QL ROSETTE: NEGATIVE
PREV RECORD VERIFICATION: NORMAL

## 2023-04-14 ENCOUNTER — APPOINTMENT (OUTPATIENT)
Dept: OBGYN | Age: 28
End: 2023-04-14

## 2023-04-14 ENCOUNTER — OB CHECK (OUTPATIENT)
Dept: OBGYN | Age: 28
End: 2023-04-14

## 2023-04-14 VITALS
RESPIRATION RATE: 16 BRPM | OXYGEN SATURATION: 98 % | SYSTOLIC BLOOD PRESSURE: 103 MMHG | HEART RATE: 91 BPM | DIASTOLIC BLOOD PRESSURE: 73 MMHG | BODY MASS INDEX: 32.63 KG/M2 | WEIGHT: 184.22 LBS

## 2023-04-14 DIAGNOSIS — Z67.91 RH NEGATIVE STATE IN ANTEPARTUM PERIOD: Primary | ICD-10-CM

## 2023-04-14 DIAGNOSIS — Z34.02 ENCOUNTER FOR SUPERVISION OF NORMAL FIRST PREGNANCY IN SECOND TRIMESTER: ICD-10-CM

## 2023-04-14 DIAGNOSIS — O26.899 RH NEGATIVE STATE IN ANTEPARTUM PERIOD: Primary | ICD-10-CM

## 2023-04-14 PROCEDURE — 96372 THER/PROPH/DIAG INJ SC/IM: CPT | Performed by: OBSTETRICS & GYNECOLOGY

## 2023-04-14 PROCEDURE — 0502F SUBSEQUENT PRENATAL CARE: CPT | Performed by: OBSTETRICS & GYNECOLOGY

## 2023-04-14 ASSESSMENT — PAIN SCALES - GENERAL: PAINLEVEL: 5

## 2023-04-26 ENCOUNTER — OB CHECK (OUTPATIENT)
Dept: OBGYN | Age: 28
End: 2023-04-26

## 2023-04-26 ENCOUNTER — OFFICE VISIT (OUTPATIENT)
Dept: MATERNAL FETAL MEDICINE | Age: 28
End: 2023-04-26
Attending: OBSTETRICS & GYNECOLOGY

## 2023-04-26 VITALS
WEIGHT: 186.51 LBS | DIASTOLIC BLOOD PRESSURE: 68 MMHG | HEART RATE: 90 BPM | BODY MASS INDEX: 33.04 KG/M2 | SYSTOLIC BLOOD PRESSURE: 103 MMHG

## 2023-04-26 DIAGNOSIS — Z67.91 RH NEGATIVE STATE IN ANTEPARTUM PERIOD: Primary | ICD-10-CM

## 2023-04-26 DIAGNOSIS — E66.9 OBESITY (BMI 30-39.9): Primary | ICD-10-CM

## 2023-04-26 DIAGNOSIS — Z34.03 ENCOUNTER FOR SUPERVISION OF NORMAL FIRST PREGNANCY IN THIRD TRIMESTER: ICD-10-CM

## 2023-04-26 DIAGNOSIS — O26.899 RH NEGATIVE STATE IN ANTEPARTUM PERIOD: Primary | ICD-10-CM

## 2023-04-26 DIAGNOSIS — D68.51 FACTOR V LEIDEN CARRIER (CMD): ICD-10-CM

## 2023-04-26 PROCEDURE — 76816 OB US FOLLOW-UP PER FETUS: CPT

## 2023-04-26 PROCEDURE — 76816 OB US FOLLOW-UP PER FETUS: CPT | Performed by: OBSTETRICS & GYNECOLOGY

## 2023-04-26 PROCEDURE — 0502F SUBSEQUENT PRENATAL CARE: CPT | Performed by: OBSTETRICS & GYNECOLOGY

## 2023-04-26 RX ORDER — CALCIUM CARBONATE 500 MG/1
1 TABLET, CHEWABLE ORAL DAILY
Status: ON HOLD | COMMUNITY
End: 2023-07-15 | Stop reason: HOSPADM

## 2023-05-10 ENCOUNTER — LAB SERVICES (OUTPATIENT)
Dept: LAB | Age: 28
End: 2023-05-10

## 2023-05-10 ENCOUNTER — OB CHECK (OUTPATIENT)
Dept: OBGYN | Age: 28
End: 2023-05-10

## 2023-05-10 VITALS
HEART RATE: 101 BPM | BODY MASS INDEX: 30.54 KG/M2 | DIASTOLIC BLOOD PRESSURE: 74 MMHG | WEIGHT: 183.31 LBS | SYSTOLIC BLOOD PRESSURE: 117 MMHG | HEIGHT: 65 IN

## 2023-05-10 DIAGNOSIS — Z34.03 ENCOUNTER FOR SUPERVISION OF NORMAL FIRST PREGNANCY IN THIRD TRIMESTER: Primary | ICD-10-CM

## 2023-05-10 DIAGNOSIS — Z34.03 ENCOUNTER FOR SUPERVISION OF NORMAL FIRST PREGNANCY IN THIRD TRIMESTER: ICD-10-CM

## 2023-05-10 DIAGNOSIS — Z13.0 SCREENING, ANEMIA, DEFICIENCY, IRON: ICD-10-CM

## 2023-05-10 DIAGNOSIS — Z11.3 ROUTINE SCREENING FOR STI (SEXUALLY TRANSMITTED INFECTION): ICD-10-CM

## 2023-05-10 PROBLEM — Z28.21 TETANUS, DIPHTHERIA, AND ACELLULAR PERTUSSIS (TDAP) VACCINATION DECLINED: Status: ACTIVE | Noted: 2023-05-10

## 2023-05-10 LAB
BASOPHILS # BLD: 0 K/MCL (ref 0–0.3)
BASOPHILS NFR BLD: 1 %
DEPRECATED RDW RBC: 43.6 FL (ref 39–50)
EOSINOPHIL # BLD: 0.1 K/MCL (ref 0–0.5)
EOSINOPHIL NFR BLD: 1 %
ERYTHROCYTE [DISTWIDTH] IN BLOOD: 13.5 % (ref 11–15)
HCT VFR BLD CALC: 35 % (ref 36–46.5)
HGB BLD-MCNC: 11.7 G/DL (ref 12–15.5)
HIV 1+2 AB+HIV1 P24 AG SERPL QL IA: NONREACTIVE
IMM GRANULOCYTES # BLD AUTO: 0.1 K/MCL (ref 0–0.2)
IMM GRANULOCYTES # BLD: 1 %
LYMPHOCYTES # BLD: 2.2 K/MCL (ref 1–4.8)
LYMPHOCYTES NFR BLD: 24 %
MCH RBC QN AUTO: 29.4 PG (ref 26–34)
MCHC RBC AUTO-ENTMCNC: 33.4 G/DL (ref 32–36.5)
MCV RBC AUTO: 87.9 FL (ref 78–100)
MONOCYTES # BLD: 0.7 K/MCL (ref 0.3–0.9)
MONOCYTES NFR BLD: 8 %
NEUTROPHILS # BLD: 5.9 K/MCL (ref 1.8–7.7)
NEUTROPHILS NFR BLD: 65 %
NRBC BLD MANUAL-RTO: 0 /100 WBC
PLATELET # BLD AUTO: 237 K/MCL (ref 140–450)
RBC # BLD: 3.98 MIL/MCL (ref 4–5.2)
WBC # BLD: 8.8 K/MCL (ref 4.2–11)

## 2023-05-10 PROCEDURE — 36415 COLL VENOUS BLD VENIPUNCTURE: CPT | Performed by: OBSTETRICS & GYNECOLOGY

## 2023-05-10 PROCEDURE — 86592 SYPHILIS TEST NON-TREP QUAL: CPT | Performed by: INTERNAL MEDICINE

## 2023-05-10 PROCEDURE — 87389 HIV-1 AG W/HIV-1&-2 AB AG IA: CPT | Performed by: INTERNAL MEDICINE

## 2023-05-10 PROCEDURE — 85025 COMPLETE CBC W/AUTO DIFF WBC: CPT | Performed by: INTERNAL MEDICINE

## 2023-05-10 PROCEDURE — 0502F SUBSEQUENT PRENATAL CARE: CPT | Performed by: OBSTETRICS & GYNECOLOGY

## 2023-05-11 LAB — RPR SER QL: NONREACTIVE

## 2023-05-26 ENCOUNTER — OB CHECK (OUTPATIENT)
Dept: OBGYN | Age: 28
End: 2023-05-26

## 2023-05-26 VITALS
SYSTOLIC BLOOD PRESSURE: 107 MMHG | WEIGHT: 184.19 LBS | DIASTOLIC BLOOD PRESSURE: 70 MMHG | HEART RATE: 75 BPM | BODY MASS INDEX: 30.65 KG/M2 | RESPIRATION RATE: 17 BRPM | OXYGEN SATURATION: 98 %

## 2023-05-26 DIAGNOSIS — Z28.21 TETANUS, DIPHTHERIA, AND ACELLULAR PERTUSSIS (TDAP) VACCINATION DECLINED: ICD-10-CM

## 2023-05-26 DIAGNOSIS — Z67.91 RH NEGATIVE STATE IN ANTEPARTUM PERIOD: ICD-10-CM

## 2023-05-26 DIAGNOSIS — Z34.03 ENCOUNTER FOR SUPERVISION OF NORMAL FIRST PREGNANCY IN THIRD TRIMESTER: Primary | ICD-10-CM

## 2023-05-26 DIAGNOSIS — O26.899 RH NEGATIVE STATE IN ANTEPARTUM PERIOD: ICD-10-CM

## 2023-05-26 DIAGNOSIS — E84.9 CF (CYSTIC FIBROSIS) (CMD): ICD-10-CM

## 2023-05-26 PROCEDURE — 0502F SUBSEQUENT PRENATAL CARE: CPT | Performed by: OBSTETRICS & GYNECOLOGY

## 2023-05-26 ASSESSMENT — PAIN SCALES - GENERAL: PAINLEVEL: 0

## 2023-06-07 ENCOUNTER — OB CHECK (OUTPATIENT)
Dept: OBGYN | Age: 28
End: 2023-06-07

## 2023-06-07 VITALS
WEIGHT: 186.62 LBS | SYSTOLIC BLOOD PRESSURE: 112 MMHG | HEART RATE: 95 BPM | DIASTOLIC BLOOD PRESSURE: 76 MMHG | BODY MASS INDEX: 31.06 KG/M2 | OXYGEN SATURATION: 100 % | RESPIRATION RATE: 17 BRPM

## 2023-06-07 DIAGNOSIS — Z67.91 RH NEGATIVE STATE IN ANTEPARTUM PERIOD: ICD-10-CM

## 2023-06-07 DIAGNOSIS — Z28.21 TETANUS, DIPHTHERIA, AND ACELLULAR PERTUSSIS (TDAP) VACCINATION DECLINED: ICD-10-CM

## 2023-06-07 DIAGNOSIS — O26.899 RH NEGATIVE STATE IN ANTEPARTUM PERIOD: ICD-10-CM

## 2023-06-07 DIAGNOSIS — Z36.85 SCREENING, ANTENATAL, FOR STREPTOCOCCUS B: Primary | ICD-10-CM

## 2023-06-07 DIAGNOSIS — Z34.03 ENCOUNTER FOR SUPERVISION OF NORMAL FIRST PREGNANCY IN THIRD TRIMESTER: ICD-10-CM

## 2023-06-07 PROCEDURE — 0502F SUBSEQUENT PRENATAL CARE: CPT | Performed by: OBSTETRICS & GYNECOLOGY

## 2023-06-07 PROCEDURE — 76815 OB US LIMITED FETUS(S): CPT | Performed by: OBSTETRICS & GYNECOLOGY

## 2023-06-07 PROCEDURE — 87081 CULTURE SCREEN ONLY: CPT | Performed by: INTERNAL MEDICINE

## 2023-06-07 RX ORDER — ASPIRIN 81 MG/1
81 TABLET ORAL DAILY
COMMUNITY
End: 2023-06-30 | Stop reason: ALTCHOICE

## 2023-06-07 ASSESSMENT — PAIN SCALES - GENERAL: PAINLEVEL: 0

## 2023-06-10 LAB — GP B STREP SPEC QL CULT: NORMAL

## 2023-06-16 ENCOUNTER — OB CHECK (OUTPATIENT)
Dept: OBGYN | Age: 28
End: 2023-06-16

## 2023-06-16 VITALS
SYSTOLIC BLOOD PRESSURE: 105 MMHG | WEIGHT: 187.06 LBS | DIASTOLIC BLOOD PRESSURE: 75 MMHG | BODY MASS INDEX: 31.17 KG/M2 | RESPIRATION RATE: 17 BRPM | HEART RATE: 100 BPM | HEIGHT: 65 IN

## 2023-06-16 DIAGNOSIS — Z28.21 TETANUS, DIPHTHERIA, AND ACELLULAR PERTUSSIS (TDAP) VACCINATION DECLINED: ICD-10-CM

## 2023-06-16 DIAGNOSIS — Z34.03 ENCOUNTER FOR SUPERVISION OF NORMAL FIRST PREGNANCY IN THIRD TRIMESTER: Primary | ICD-10-CM

## 2023-06-16 DIAGNOSIS — O26.899 RH NEGATIVE STATE IN ANTEPARTUM PERIOD: ICD-10-CM

## 2023-06-16 DIAGNOSIS — Z67.91 RH NEGATIVE STATE IN ANTEPARTUM PERIOD: ICD-10-CM

## 2023-06-16 PROCEDURE — 0502F SUBSEQUENT PRENATAL CARE: CPT | Performed by: LEGAL MEDICINE

## 2023-06-23 ENCOUNTER — OB CHECK (OUTPATIENT)
Dept: OBGYN | Age: 28
End: 2023-06-23

## 2023-06-23 VITALS
BODY MASS INDEX: 31.28 KG/M2 | WEIGHT: 187.72 LBS | HEART RATE: 78 BPM | DIASTOLIC BLOOD PRESSURE: 67 MMHG | RESPIRATION RATE: 16 BRPM | SYSTOLIC BLOOD PRESSURE: 100 MMHG | OXYGEN SATURATION: 97 % | HEIGHT: 65 IN | TEMPERATURE: 98.2 F

## 2023-06-23 DIAGNOSIS — Z34.03 ENCOUNTER FOR SUPERVISION OF NORMAL FIRST PREGNANCY IN THIRD TRIMESTER: Primary | ICD-10-CM

## 2023-06-23 DIAGNOSIS — Z67.91 RH NEGATIVE STATE IN ANTEPARTUM PERIOD: ICD-10-CM

## 2023-06-23 DIAGNOSIS — O26.899 RH NEGATIVE STATE IN ANTEPARTUM PERIOD: ICD-10-CM

## 2023-06-23 DIAGNOSIS — Z28.21 TETANUS, DIPHTHERIA, AND ACELLULAR PERTUSSIS (TDAP) VACCINATION DECLINED: ICD-10-CM

## 2023-06-23 PROCEDURE — 0502F SUBSEQUENT PRENATAL CARE: CPT | Performed by: LEGAL MEDICINE

## 2023-06-30 ENCOUNTER — TELEPHONE (OUTPATIENT)
Dept: OBGYN | Age: 28
End: 2023-06-30

## 2023-06-30 ENCOUNTER — OB CHECK (OUTPATIENT)
Dept: OBGYN | Age: 28
End: 2023-06-30

## 2023-06-30 VITALS
HEART RATE: 86 BPM | SYSTOLIC BLOOD PRESSURE: 122 MMHG | BODY MASS INDEX: 30.93 KG/M2 | OXYGEN SATURATION: 99 % | DIASTOLIC BLOOD PRESSURE: 77 MMHG | WEIGHT: 185.85 LBS

## 2023-06-30 DIAGNOSIS — Z67.91 RH NEGATIVE STATE IN ANTEPARTUM PERIOD: ICD-10-CM

## 2023-06-30 DIAGNOSIS — O26.899 RH NEGATIVE STATE IN ANTEPARTUM PERIOD: ICD-10-CM

## 2023-06-30 DIAGNOSIS — Z34.03 ENCOUNTER FOR SUPERVISION OF NORMAL FIRST PREGNANCY IN THIRD TRIMESTER: Primary | ICD-10-CM

## 2023-06-30 DIAGNOSIS — Z28.21 TETANUS, DIPHTHERIA, AND ACELLULAR PERTUSSIS (TDAP) VACCINATION DECLINED: ICD-10-CM

## 2023-06-30 PROCEDURE — 0502F SUBSEQUENT PRENATAL CARE: CPT | Performed by: LEGAL MEDICINE

## 2023-07-05 ENCOUNTER — PREP FOR CASE (OUTPATIENT)
Dept: OBGYN | Age: 28
End: 2023-07-05

## 2023-07-05 ENCOUNTER — OB CHECK (OUTPATIENT)
Dept: OBGYN | Age: 28
End: 2023-07-05

## 2023-07-05 VITALS
HEART RATE: 93 BPM | WEIGHT: 185.63 LBS | SYSTOLIC BLOOD PRESSURE: 111 MMHG | HEIGHT: 65 IN | BODY MASS INDEX: 30.93 KG/M2 | DIASTOLIC BLOOD PRESSURE: 76 MMHG

## 2023-07-05 DIAGNOSIS — O09.93 SUPERVISION OF HIGH RISK PREGNANCY IN THIRD TRIMESTER: Primary | ICD-10-CM

## 2023-07-05 PROCEDURE — 59025 FETAL NON-STRESS TEST: CPT | Performed by: OBSTETRICS & GYNECOLOGY

## 2023-07-05 PROCEDURE — 76815 OB US LIMITED FETUS(S): CPT | Performed by: OBSTETRICS & GYNECOLOGY

## 2023-07-05 PROCEDURE — 0502F SUBSEQUENT PRENATAL CARE: CPT | Performed by: OBSTETRICS & GYNECOLOGY

## 2023-07-12 ENCOUNTER — ANESTHESIA (OUTPATIENT)
Dept: OBGYN | Age: 28
End: 2023-07-12

## 2023-07-12 ENCOUNTER — HOSPITAL ENCOUNTER (INPATIENT)
Age: 28
LOS: 3 days | Discharge: HOME | End: 2023-07-15
Attending: OBSTETRICS & GYNECOLOGY | Admitting: OBSTETRICS & GYNECOLOGY

## 2023-07-12 ENCOUNTER — ANESTHESIA EVENT (OUTPATIENT)
Dept: OBGYN | Age: 28
End: 2023-07-12

## 2023-07-12 ENCOUNTER — TELEPHONE (OUTPATIENT)
Dept: OBGYN | Age: 28
End: 2023-07-12

## 2023-07-12 LAB
ABO + RH BLD: NORMAL
BASOPHILS # BLD: 0 K/MCL (ref 0–0.3)
BASOPHILS NFR BLD: 0 %
BLD GP AB INVEST PLASRBC-IMP: NORMAL
BLD GP AB SCN SERPL QL GEL: POSITIVE
BLOOD EXPIRATION DATE: NORMAL
BLOOD EXPIRATION DATE: NORMAL
CROSSMATCH RESULT: NORMAL
CROSSMATCH RESULT: NORMAL
DEPRECATED RDW RBC: 44.8 FL (ref 39–50)
DISPENSE STATUS: NORMAL
DISPENSE STATUS: NORMAL
EOSINOPHIL # BLD: 0 K/MCL (ref 0–0.5)
EOSINOPHIL NFR BLD: 0 %
ERYTHROCYTE [DISTWIDTH] IN BLOOD: 13.8 % (ref 11–15)
HCT VFR BLD CALC: 39.3 % (ref 36–46.5)
HGB BLD-MCNC: 12.9 G/DL (ref 12–15.5)
IMM GRANULOCYTES # BLD AUTO: 0 K/MCL (ref 0–0.2)
IMM GRANULOCYTES # BLD: 0 %
ISBT BLOOD TYPE: 600
ISBT BLOOD TYPE: 600
LYMPHOCYTES # BLD: 1.9 K/MCL (ref 1–4.8)
LYMPHOCYTES NFR BLD: 21 %
MCH RBC QN AUTO: 28.9 PG (ref 26–34)
MCHC RBC AUTO-ENTMCNC: 32.8 G/DL (ref 32–36.5)
MCV RBC AUTO: 87.9 FL (ref 78–100)
MONOCYTES # BLD: 0.7 K/MCL (ref 0.3–0.9)
MONOCYTES NFR BLD: 8 %
NEUTROPHILS # BLD: 6.6 K/MCL (ref 1.8–7.7)
NEUTROPHILS NFR BLD: 71 %
NRBC BLD MANUAL-RTO: 0 /100 WBC
PLATELET # BLD AUTO: 208 K/MCL (ref 140–450)
PRODUCT CODE: NORMAL
PRODUCT CODE: NORMAL
PRODUCT DESCRIPTION: NORMAL
PRODUCT DESCRIPTION: NORMAL
PRODUCT ID: NORMAL
PRODUCT ID: NORMAL
RBC # BLD: 4.47 MIL/MCL (ref 4–5.2)
TYPE AND SCREEN EXPIRATION DATE: NORMAL
UNIT BLOOD TYPE: NORMAL
UNIT BLOOD TYPE: NORMAL
UNIT NUMBER: NORMAL
UNIT NUMBER: NORMAL
WBC # BLD: 9.4 K/MCL (ref 4.2–11)

## 2023-07-12 PROCEDURE — C9113 INJ PANTOPRAZOLE SODIUM, VIA: HCPCS | Performed by: OBSTETRICS & GYNECOLOGY

## 2023-07-12 PROCEDURE — 10002801 HB RX 250 W/O HCPCS: Performed by: ANESTHESIOLOGY

## 2023-07-12 PROCEDURE — 13003286 HB ROOM CHARGE L&D

## 2023-07-12 PROCEDURE — 85025 COMPLETE CBC W/AUTO DIFF WBC: CPT | Performed by: OBSTETRICS & GYNECOLOGY

## 2023-07-12 PROCEDURE — 10002801 HB RX 250 W/O HCPCS: Performed by: OBSTETRICS & GYNECOLOGY

## 2023-07-12 PROCEDURE — 10002800 HB RX 250 W HCPCS: Performed by: OBSTETRICS & GYNECOLOGY

## 2023-07-12 PROCEDURE — 10002807 HB RX 258: Performed by: OBSTETRICS & GYNECOLOGY

## 2023-07-12 PROCEDURE — 13000012 HB ANESTHESIA SPINAL/EPIDURAL IN L&D: Performed by: ANESTHESIOLOGY

## 2023-07-12 PROCEDURE — 86870 RBC ANTIBODY IDENTIFICATION: CPT | Performed by: OBSTETRICS & GYNECOLOGY

## 2023-07-12 PROCEDURE — 59025 FETAL NON-STRESS TEST: CPT | Performed by: OBSTETRICS & GYNECOLOGY

## 2023-07-12 PROCEDURE — 86850 RBC ANTIBODY SCREEN: CPT | Performed by: OBSTETRICS & GYNECOLOGY

## 2023-07-12 PROCEDURE — 10002800 HB RX 250 W HCPCS: Performed by: ANESTHESIOLOGY

## 2023-07-12 PROCEDURE — 86920 COMPATIBILITY TEST SPIN: CPT

## 2023-07-12 RX ORDER — BUPIVACAINE HYDROCHLORIDE 2.5 MG/ML
INJECTION, SOLUTION EPIDURAL; INFILTRATION; INTRACAUDAL
Status: COMPLETED | OUTPATIENT
Start: 2023-07-12 | End: 2023-07-12

## 2023-07-12 RX ORDER — BUPIVACAINE HYDROCHLORIDE 2.5 MG/ML
10 INJECTION, SOLUTION EPIDURAL; INFILTRATION; INTRACAUDAL ONCE
Status: DISCONTINUED | OUTPATIENT
Start: 2023-07-12 | End: 2023-07-13

## 2023-07-12 RX ORDER — SODIUM CHLORIDE, SODIUM LACTATE, POTASSIUM CHLORIDE, CALCIUM CHLORIDE 600; 310; 30; 20 MG/100ML; MG/100ML; MG/100ML; MG/100ML
INJECTION, SOLUTION INTRAVENOUS CONTINUOUS
Status: DISCONTINUED | OUTPATIENT
Start: 2023-07-12 | End: 2023-07-13

## 2023-07-12 RX ORDER — HEPARIN SOD,PORK IN 0.45% NACL 25000/500
INTRAVENOUS SOLUTION INTRAVENOUS CONTINUOUS
Status: DISCONTINUED | OUTPATIENT
Start: 2023-07-12 | End: 2023-07-13

## 2023-07-12 RX ORDER — LIDOCAINE HYDROCHLORIDE AND EPINEPHRINE 15; 5 MG/ML; UG/ML
INJECTION, SOLUTION EPIDURAL
Status: COMPLETED | OUTPATIENT
Start: 2023-07-12 | End: 2023-07-12

## 2023-07-12 RX ORDER — PANTOPRAZOLE SODIUM 40 MG/10ML
40 INJECTION, POWDER, LYOPHILIZED, FOR SOLUTION INTRAVENOUS NIGHTLY
Status: DISCONTINUED | OUTPATIENT
Start: 2023-07-12 | End: 2023-07-13

## 2023-07-12 RX ORDER — 0.9 % SODIUM CHLORIDE 0.9 %
2 VIAL (ML) INJECTION EVERY 12 HOURS SCHEDULED
Status: DISCONTINUED | OUTPATIENT
Start: 2023-07-12 | End: 2023-07-13

## 2023-07-12 RX ORDER — ONDANSETRON 2 MG/ML
4 INJECTION INTRAMUSCULAR; INTRAVENOUS 2 TIMES DAILY PRN
Status: DISCONTINUED | OUTPATIENT
Start: 2023-07-12 | End: 2023-07-13

## 2023-07-12 RX ORDER — LIDOCAINE HYDROCHLORIDE 10 MG/ML
30 INJECTION, SOLUTION EPIDURAL; INFILTRATION; INTRACAUDAL; PERINEURAL
Status: DISCONTINUED | OUTPATIENT
Start: 2023-07-12 | End: 2023-07-13

## 2023-07-12 RX ORDER — OXYTOCIN-SODIUM CHLORIDE 0.9% IV SOLN 30 UNIT/500ML 30-0.9/5 UT/ML-%
0-334 SOLUTION INTRAVENOUS CONTINUOUS
Status: DISCONTINUED | OUTPATIENT
Start: 2023-07-12 | End: 2023-07-13

## 2023-07-12 RX ORDER — OXYTOCIN 10 [USP'U]/ML
10 INJECTION, SOLUTION INTRAMUSCULAR; INTRAVENOUS
Status: DISCONTINUED | OUTPATIENT
Start: 2023-07-12 | End: 2023-07-13

## 2023-07-12 RX ORDER — CALCIUM CARBONATE 500 MG/1
500 TABLET, CHEWABLE ORAL EVERY 4 HOURS PRN
Status: DISCONTINUED | OUTPATIENT
Start: 2023-07-12 | End: 2023-07-13

## 2023-07-12 RX ORDER — METOCLOPRAMIDE HYDROCHLORIDE 5 MG/ML
10 INJECTION INTRAMUSCULAR; INTRAVENOUS ONCE
Status: COMPLETED | OUTPATIENT
Start: 2023-07-12 | End: 2023-07-12

## 2023-07-12 RX ADMIN — FENTANYL CITRATE 50 MCG: 50 INJECTION, SOLUTION INTRAMUSCULAR; INTRAVENOUS at 21:21

## 2023-07-12 RX ADMIN — SODIUM CHLORIDE, POTASSIUM CHLORIDE, SODIUM LACTATE AND CALCIUM CHLORIDE: 600; 310; 30; 20 INJECTION, SOLUTION INTRAVENOUS at 22:17

## 2023-07-12 RX ADMIN — Medication 10 ML/HR: at 23:40

## 2023-07-12 RX ADMIN — ONDANSETRON 4 MG: 2 INJECTION INTRAMUSCULAR; INTRAVENOUS at 18:44

## 2023-07-12 RX ADMIN — PANTOPRAZOLE SODIUM 40 MG: 40 INJECTION, POWDER, FOR SOLUTION INTRAVENOUS at 21:28

## 2023-07-12 RX ADMIN — LIDOCAINE HYDROCHLORIDE,EPINEPHRINE BITARTRATE 3 ML: 15; .005 INJECTION, SOLUTION EPIDURAL; INFILTRATION; INTRACAUDAL; PERINEURAL at 23:20

## 2023-07-12 RX ADMIN — BUPIVACAINE HYDROCHLORIDE 5 ML: 2.5 INJECTION, SOLUTION EPIDURAL; INFILTRATION; INTRACAUDAL at 23:20

## 2023-07-12 RX ADMIN — SODIUM CHLORIDE, POTASSIUM CHLORIDE, SODIUM LACTATE AND CALCIUM CHLORIDE: 600; 310; 30; 20 INJECTION, SOLUTION INTRAVENOUS at 23:20

## 2023-07-12 RX ADMIN — METOCLOPRAMIDE 10 MG: 5 INJECTION, SOLUTION INTRAMUSCULAR; INTRAVENOUS at 22:18

## 2023-07-12 SDOH — ECONOMIC STABILITY: TRANSPORTATION INSECURITY
IN THE PAST 12 MONTHS, HAS LACK OF TRANSPORTATION KEPT YOU FROM MEETINGS, WORK, OR FROM GETTING THINGS NEEDED FOR DAILY LIVING?: NO

## 2023-07-12 SDOH — SOCIAL STABILITY: SOCIAL NETWORK
HOW OFTEN DO YOU SEE OR TALK TO PEOPLE THAT YOU CARE ABOUT AND FEEL CLOSE TO? (FOR EXAMPLE: TALKING TO FRIENDS ON THE PHONE, VISITING FRIENDS OR FAMILY, GOING TO CHURCH OR CLUB MEETINGS): 5 OR MORE TIMES A WEEK

## 2023-07-12 SDOH — ECONOMIC STABILITY: HOUSING INSECURITY: WHAT IS YOUR LIVING SITUATION TODAY?: HOUSE

## 2023-07-12 SDOH — ECONOMIC STABILITY: TRANSPORTATION INSECURITY
IN THE PAST 12 MONTHS, HAS THE LACK OF TRANSPORTATION KEPT YOU FROM MEDICAL APPOINTMENTS OR FROM GETTING MEDICATIONS?: NO

## 2023-07-12 SDOH — SOCIAL STABILITY: SOCIAL NETWORK: SUPPORT SYSTEMS: FRIENDS;SPOUSE;CHURCH/FAITH COMMUNITY

## 2023-07-12 SDOH — HEALTH STABILITY: GENERAL: BECAUSE OF A PHYSICAL, MENTAL, OR EMOTIONAL CONDITION, DO YOU HAVE DIFFICULTY DOING ERRANDS ALONE?: NO

## 2023-07-12 SDOH — ECONOMIC STABILITY: HOUSING INSECURITY: ARE YOU WORRIED ABOUT LOSING YOUR HOUSING?: NO

## 2023-07-12 SDOH — ECONOMIC STABILITY: HOUSING INSECURITY: WHAT IS YOUR LIVING SITUATION TODAY?: SPOUSE

## 2023-07-12 SDOH — HEALTH STABILITY: GENERAL
BECAUSE OF A PHYSICAL, MENTAL, OR EMOTIONAL CONDITION, DO YOU HAVE SERIOUS DIFFICULTY CONCENTRATING, REMEMBERING OR MAKING DECISIONS?: NO

## 2023-07-12 SDOH — HEALTH STABILITY: PHYSICAL HEALTH: DO YOU HAVE DIFFICULTY DRESSING OR BATHING?: NO

## 2023-07-12 SDOH — ECONOMIC STABILITY: FOOD INSECURITY
HOW OFTEN IN THE PAST 12 MONTHS WERE YOU WORRIED OR STRESSED ABOUT HAVING ENOUGH MONEY TO BUY NUTRITIOUS MEALS?: SOMETIMES

## 2023-07-12 SDOH — ECONOMIC STABILITY: GENERAL

## 2023-07-12 SDOH — HEALTH STABILITY: PHYSICAL HEALTH: DO YOU HAVE SERIOUS DIFFICULTY WALKING OR CLIMBING STAIRS?: NO

## 2023-07-12 ASSESSMENT — ENCOUNTER SYMPTOMS
ABDOMINAL DISTENTION: 0
NERVOUS/ANXIOUS: 1
NAUSEA: 1
CONSTITUTIONAL NEGATIVE: 1
VOMITING: 1
RESPIRATORY NEGATIVE: 1
NEUROLOGICAL NEGATIVE: 1

## 2023-07-12 ASSESSMENT — ACTIVITIES OF DAILY LIVING (ADL)
ADL_SCORE: 12
ADL_BEFORE_ADMISSION: INDEPENDENT
ADL_SHORT_OF_BREATH: NO
RECENT_DECLINE_ADL: NO

## 2023-07-12 ASSESSMENT — LIFESTYLE VARIABLES
ALCOHOL_USE_STATUS: NO OR LOW RISK WITH VALIDATED TOOL
AUDIT-C TOTAL SCORE: 0
HOW MANY STANDARD DRINKS CONTAINING ALCOHOL DO YOU HAVE ON A TYPICAL DAY: 0,1 OR 2
HOW OFTEN DO YOU HAVE A DRINK CONTAINING ALCOHOL: NEVER
HOW OFTEN DO YOU HAVE 6 OR MORE DRINKS ON ONE OCCASION: NEVER

## 2023-07-13 LAB
BASE EXCESS / DEFICIT, VENOUS CORD BLOOD - RESPIRATORY: -5 MMOL/L
HCO3 BLDCOV-SCNC: 19 MMOL/L (ref 22–29)
PCO2 BLDCOV: 32 MM HG (ref 23–49)
PH BLDCOV: 7.39 UNITS (ref 7.25–7.45)
PO2 BLDCOV: 38 MM HG (ref 17–41)

## 2023-07-13 PROCEDURE — 10002801 HB RX 250 W/O HCPCS: Performed by: ANESTHESIOLOGY

## 2023-07-13 PROCEDURE — 10004651 HB RX, NO CHARGE ITEM: Performed by: OBSTETRICS & GYNECOLOGY

## 2023-07-13 PROCEDURE — 59400 OBSTETRICAL CARE: CPT | Performed by: OBSTETRICS & GYNECOLOGY

## 2023-07-13 PROCEDURE — 13001455 HB PERINATAL CARE HIGH RISK

## 2023-07-13 PROCEDURE — 13000001 HB PHASE II RECOVERY EA 30 MINUTES

## 2023-07-13 PROCEDURE — 10002807 HB RX 258: Performed by: OBSTETRICS & GYNECOLOGY

## 2023-07-13 PROCEDURE — 82803 BLOOD GASES ANY COMBINATION: CPT

## 2023-07-13 PROCEDURE — 10H07YZ INSERTION OF OTHER DEVICE INTO PRODUCTS OF CONCEPTION, VIA NATURAL OR ARTIFICIAL OPENING: ICD-10-PCS | Performed by: OBSTETRICS & GYNECOLOGY

## 2023-07-13 PROCEDURE — 0UQGXZZ REPAIR VAGINA, EXTERNAL APPROACH: ICD-10-PCS | Performed by: OBSTETRICS & GYNECOLOGY

## 2023-07-13 PROCEDURE — S9445 PT EDUCATION NOC INDIVID: HCPCS

## 2023-07-13 PROCEDURE — 10002803 HB RX 637: Performed by: OBSTETRICS & GYNECOLOGY

## 2023-07-13 PROCEDURE — 10907ZC DRAINAGE OF AMNIOTIC FLUID, THERAPEUTIC FROM PRODUCTS OF CONCEPTION, VIA NATURAL OR ARTIFICIAL OPENING: ICD-10-PCS | Performed by: OBSTETRICS & GYNECOLOGY

## 2023-07-13 PROCEDURE — 10002800 HB RX 250 W HCPCS: Performed by: OBSTETRICS & GYNECOLOGY

## 2023-07-13 PROCEDURE — 13003251 HB VAGINAL DELIVERY HIGH RISK

## 2023-07-13 PROCEDURE — 10000003 HB ROOM CHARGE WOMEN'S HEALTH

## 2023-07-13 RX ORDER — OXYCODONE HYDROCHLORIDE 5 MG/1
5 TABLET ORAL EVERY 4 HOURS PRN
Status: DISCONTINUED | OUTPATIENT
Start: 2023-07-13 | End: 2023-07-14

## 2023-07-13 RX ORDER — OXYTOCIN 10 [USP'U]/ML
10 INJECTION, SOLUTION INTRAMUSCULAR; INTRAVENOUS
Status: DISCONTINUED | OUTPATIENT
Start: 2023-07-13 | End: 2023-07-15 | Stop reason: HOSPADM

## 2023-07-13 RX ORDER — IBUPROFEN 600 MG/1
600 TABLET ORAL EVERY 6 HOURS PRN
Status: DISCONTINUED | OUTPATIENT
Start: 2023-07-13 | End: 2023-07-15 | Stop reason: HOSPADM

## 2023-07-13 RX ORDER — AMOXICILLIN 250 MG
2 CAPSULE ORAL DAILY
Status: DISCONTINUED | OUTPATIENT
Start: 2023-07-14 | End: 2023-07-15 | Stop reason: HOSPADM

## 2023-07-13 RX ORDER — ACETAMINOPHEN 325 MG/1
650 TABLET ORAL EVERY 6 HOURS PRN
Status: DISCONTINUED | OUTPATIENT
Start: 2023-07-13 | End: 2023-07-15 | Stop reason: HOSPADM

## 2023-07-13 RX ORDER — HYDROCORTISONE ACETATE PRAMOXINE HCL 2.5; 1 G/100G; G/100G
CREAM TOPICAL 3 TIMES DAILY PRN
Status: DISCONTINUED | OUTPATIENT
Start: 2023-07-13 | End: 2023-07-15 | Stop reason: HOSPADM

## 2023-07-13 RX ORDER — CALCIUM CARBONATE 500 MG/1
500 TABLET, CHEWABLE ORAL EVERY 4 HOURS PRN
Status: DISCONTINUED | OUTPATIENT
Start: 2023-07-13 | End: 2023-07-15 | Stop reason: HOSPADM

## 2023-07-13 RX ORDER — ASPIRIN 81 MG/1
81 TABLET, CHEWABLE ORAL DAILY
Status: ON HOLD | COMMUNITY
End: 2023-07-15 | Stop reason: HOSPADM

## 2023-07-13 RX ORDER — OXYTOCIN/0.9 % SODIUM CHLORIDE 30/500 ML
2 PLASTIC BAG, INJECTION (ML) INTRAVENOUS CONTINUOUS
Status: DISCONTINUED | OUTPATIENT
Start: 2023-07-13 | End: 2023-07-13

## 2023-07-13 RX ORDER — VITAMIN A, VITAMIN C, VITAMIN D-3, VITAMIN E, VITAMIN B-1, VITAMIN B-2, NIACIN, VITAMIN B-6, CALCIUM, IRON, ZINC, COPPER 4000; 120; 400; 22; 1.84; 3; 20; 10; 1; 12; 200; 27; 25; 2 [IU]/1; MG/1; [IU]/1; MG/1; MG/1; MG/1; MG/1; MG/1; MG/1; UG/1; MG/1; MG/1; MG/1; MG/1
1 TABLET ORAL DAILY
Status: DISCONTINUED | OUTPATIENT
Start: 2023-07-13 | End: 2023-07-15 | Stop reason: HOSPADM

## 2023-07-13 RX ORDER — SIMETHICONE 125 MG
125 TABLET,CHEWABLE ORAL EVERY 4 HOURS PRN
Status: DISCONTINUED | OUTPATIENT
Start: 2023-07-13 | End: 2023-07-15 | Stop reason: HOSPADM

## 2023-07-13 RX ORDER — ONDANSETRON 2 MG/ML
4 INJECTION INTRAMUSCULAR; INTRAVENOUS 2 TIMES DAILY PRN
Status: ACTIVE | OUTPATIENT
Start: 2023-07-13 | End: 2023-07-15

## 2023-07-13 RX ORDER — OXYTOCIN-SODIUM CHLORIDE 0.9% IV SOLN 30 UNIT/500ML 30-0.9/5 UT/ML-%
0-334 SOLUTION INTRAVENOUS CONTINUOUS
Status: DISCONTINUED | OUTPATIENT
Start: 2023-07-13 | End: 2023-07-15 | Stop reason: HOSPADM

## 2023-07-13 RX ADMIN — IBUPROFEN 600 MG: 600 TABLET, FILM COATED ORAL at 16:04

## 2023-07-13 RX ADMIN — IBUPROFEN 600 MG: 600 TABLET, FILM COATED ORAL at 22:12

## 2023-07-13 RX ADMIN — SODIUM CHLORIDE, POTASSIUM CHLORIDE, SODIUM LACTATE AND CALCIUM CHLORIDE: 600; 310; 30; 20 INJECTION, SOLUTION INTRAVENOUS at 05:13

## 2023-07-13 RX ADMIN — Medication 2 MILLI-UNITS/MIN: at 09:21

## 2023-07-13 RX ADMIN — ACETAMINOPHEN 650 MG: 325 TABLET ORAL at 20:20

## 2023-07-13 RX ADMIN — VITAMIN A, VITAMIN C, VITAMIN D, VITAMIN E, THIAMINE, RIBOFLAVIN, NIACIN, VITAMIN B6, FOLIC ACID, VITAMIN B12, CALCIUM, IRON, ZINC, COPPER 1 TABLET: 4000; 120; 400; 22; 1.84; 3; 20; 10; 1; 12; 200; 27; 25; 2 TABLET ORAL at 20:21

## 2023-07-13 RX ADMIN — Medication 200 MCG: at 08:40

## 2023-07-13 ASSESSMENT — PAIN SCALES - GENERAL
PAINLEVEL_OUTOF10: 2
PAINLEVEL_OUTOF10: 4
PAINLEVEL_OUTOF10: 4
PAINLEVEL_OUTOF10: 2
PAINLEVEL_OUTOF10: 2
PAINLEVEL_OUTOF10: 3
PAINLEVEL_OUTOF10: 4
PAINLEVEL_OUTOF10: 2
PAINLEVEL_OUTOF10: 4
PAINLEVEL_OUTOF10: 2
PAINLEVEL_OUTOF10: 4
PAINLEVEL_OUTOF10: 4
PAINLEVEL_OUTOF10: 3

## 2023-07-14 LAB
ABO + RH BLD: NORMAL
FETAL CELL SCN BLD QL ROSETTE: NEGATIVE

## 2023-07-14 PROCEDURE — 10002800 HB RX 250 W HCPCS: Performed by: OBSTETRICS & GYNECOLOGY

## 2023-07-14 PROCEDURE — 10000003 HB ROOM CHARGE WOMEN'S HEALTH

## 2023-07-14 PROCEDURE — S9445 PT EDUCATION NOC INDIVID: HCPCS

## 2023-07-14 PROCEDURE — 10002803 HB RX 637: Performed by: OBSTETRICS & GYNECOLOGY

## 2023-07-14 PROCEDURE — 36415 COLL VENOUS BLD VENIPUNCTURE: CPT | Performed by: OBSTETRICS & GYNECOLOGY

## 2023-07-14 PROCEDURE — 86901 BLOOD TYPING SEROLOGIC RH(D): CPT | Performed by: OBSTETRICS & GYNECOLOGY

## 2023-07-14 PROCEDURE — 10004651 HB RX, NO CHARGE ITEM: Performed by: OBSTETRICS & GYNECOLOGY

## 2023-07-14 RX ADMIN — SENNOSIDES AND DOCUSATE SODIUM 2 TABLET: 50; 8.6 TABLET ORAL at 09:02

## 2023-07-14 RX ADMIN — HUMAN RHO(D) IMMUNE GLOBULIN 300 MCG: 1500 SOLUTION INTRAMUSCULAR; INTRAVENOUS at 16:53

## 2023-07-14 RX ADMIN — ACETAMINOPHEN 650 MG: 325 TABLET ORAL at 21:42

## 2023-07-14 RX ADMIN — ACETAMINOPHEN 650 MG: 325 TABLET ORAL at 15:19

## 2023-07-14 RX ADMIN — IBUPROFEN 600 MG: 600 TABLET, FILM COATED ORAL at 16:50

## 2023-07-14 RX ADMIN — IBUPROFEN 600 MG: 600 TABLET, FILM COATED ORAL at 04:10

## 2023-07-14 RX ADMIN — ACETAMINOPHEN 650 MG: 325 TABLET ORAL at 03:11

## 2023-07-14 RX ADMIN — IBUPROFEN 600 MG: 600 TABLET, FILM COATED ORAL at 10:19

## 2023-07-14 RX ADMIN — IBUPROFEN 600 MG: 600 TABLET, FILM COATED ORAL at 22:36

## 2023-07-14 RX ADMIN — VITAMIN A, VITAMIN C, VITAMIN D, VITAMIN E, THIAMINE, RIBOFLAVIN, NIACIN, VITAMIN B6, FOLIC ACID, VITAMIN B12, CALCIUM, IRON, ZINC, COPPER 1 TABLET: 4000; 120; 400; 22; 1.84; 3; 20; 10; 1; 12; 200; 27; 25; 2 TABLET ORAL at 21:42

## 2023-07-14 RX ADMIN — SIMETHICONE 125 MG: 125 TABLET, CHEWABLE ORAL at 04:15

## 2023-07-14 RX ADMIN — ACETAMINOPHEN 650 MG: 325 TABLET ORAL at 09:02

## 2023-07-14 ASSESSMENT — PAIN SCALES - GENERAL
PAINLEVEL_OUTOF10: 2
PAINLEVEL_OUTOF10: 2
PAINLEVEL_OUTOF10: 3
PAINLEVEL_OUTOF10: 3
PAINLEVEL_OUTOF10: 2
PAINLEVEL_OUTOF10: 4
PAINLEVEL_OUTOF10: 3
PAINLEVEL_OUTOF10: 2
PAINLEVEL_OUTOF10: 4
PAINLEVEL_OUTOF10: 2
PAINLEVEL_OUTOF10: 4
PAINLEVEL_OUTOF10: 3

## 2023-07-15 VITALS
BODY MASS INDEX: 32.78 KG/M2 | DIASTOLIC BLOOD PRESSURE: 74 MMHG | HEIGHT: 63 IN | RESPIRATION RATE: 16 BRPM | OXYGEN SATURATION: 98 % | SYSTOLIC BLOOD PRESSURE: 111 MMHG | TEMPERATURE: 97.9 F | WEIGHT: 185 LBS | HEART RATE: 80 BPM

## 2023-07-15 PROCEDURE — S9445 PT EDUCATION NOC INDIVID: HCPCS

## 2023-07-15 PROCEDURE — 10004651 HB RX, NO CHARGE ITEM: Performed by: OBSTETRICS & GYNECOLOGY

## 2023-07-15 PROCEDURE — 10002803 HB RX 637: Performed by: OBSTETRICS & GYNECOLOGY

## 2023-07-15 RX ORDER — IBUPROFEN 200 MG
600 TABLET ORAL EVERY 6 HOURS PRN
Status: SHIPPED | INPATIENT
Start: 2023-07-15 | End: 2023-08-17 | Stop reason: CLARIF

## 2023-07-15 RX ORDER — ACETAMINOPHEN 325 MG/1
650 TABLET ORAL EVERY 6 HOURS PRN
Status: SHIPPED | INPATIENT
Start: 2023-07-15 | End: 2023-08-17 | Stop reason: CLARIF

## 2023-07-15 RX ADMIN — IBUPROFEN 600 MG: 600 TABLET, FILM COATED ORAL at 04:38

## 2023-07-15 RX ADMIN — ACETAMINOPHEN 650 MG: 325 TABLET ORAL at 03:42

## 2023-07-15 RX ADMIN — IBUPROFEN 600 MG: 600 TABLET, FILM COATED ORAL at 14:14

## 2023-07-15 ASSESSMENT — PAIN SCALES - GENERAL
PAINLEVEL_OUTOF10: 3
PAINLEVEL_OUTOF10: 2

## 2023-07-18 ENCOUNTER — TELEPHONE (OUTPATIENT)
Dept: OBGYN | Age: 28
End: 2023-07-18

## 2023-07-24 ENCOUNTER — POSTPARTUM VISIT (OUTPATIENT)
Dept: OBGYN | Age: 28
End: 2023-07-24

## 2023-07-24 VITALS
HEART RATE: 97 BPM | HEIGHT: 65 IN | BODY MASS INDEX: 28.21 KG/M2 | WEIGHT: 169.3 LBS | SYSTOLIC BLOOD PRESSURE: 121 MMHG | DIASTOLIC BLOOD PRESSURE: 79 MMHG

## 2023-07-24 PROCEDURE — 0503F POSTPARTUM CARE VISIT: CPT | Performed by: OBSTETRICS & GYNECOLOGY

## 2023-07-24 ASSESSMENT — EDINBURGH POSTNATAL DEPRESSION SCALE (EPDS)
I HAVE BEEN SO UNHAPPY THAT I HAVE HAD DIFFICULTY SLEEPING: NOT AT ALL
I HAVE LOOKED FORWARD WITH ENJOYMENT TO THINGS: AS MUCH AS I EVER DID
TOTAL SCORE: 3
I HAVE BEEN SO UNHAPPY THAT I HAVE BEEN CRYING: NO, NEVER
I HAVE FELT SAD OR MISERABLE: NOT VERY OFTEN
I HAVE BEEN ABLE TO LAUGH AND SEE THE FUNNY SIDE OF THINGS: AS MUCH AS I ALWAYS COULD
I HAVE BEEN ANXIOUS OR WORRIED FOR NO GOOD REASON: NO, NOT AT ALL
THINGS HAVE BEEN GETTING ON TOP OF ME: NO, MOST OF THE TIME I HAVE COPED QUITE WELL
I HAVE FELT SCARED OR PANICKY FOR NO GOOD REASON: NO, NOT AT ALL
I HAVE BLAMED MYSELF UNNECESSARILY WHEN THINGS WENT WRONG: NOT VERY OFTEN
THE THOUGHT OF HARMING MYSELF HAS OCCURRED TO ME: NEVER

## 2023-08-16 ENCOUNTER — E-ADVICE (OUTPATIENT)
Dept: OBGYN | Age: 28
End: 2023-08-16

## 2023-08-17 ENCOUNTER — POSTPARTUM VISIT (OUTPATIENT)
Dept: OBGYN | Age: 28
End: 2023-08-17

## 2023-08-17 VITALS
DIASTOLIC BLOOD PRESSURE: 73 MMHG | HEART RATE: 94 BPM | SYSTOLIC BLOOD PRESSURE: 110 MMHG | BODY MASS INDEX: 28.04 KG/M2 | WEIGHT: 168.32 LBS | HEIGHT: 65 IN

## 2023-08-17 PROCEDURE — 0503F POSTPARTUM CARE VISIT: CPT | Performed by: OBSTETRICS & GYNECOLOGY

## 2023-08-17 RX ORDER — NORETHINDRONE ACETATE AND ETHINYL ESTRADIOL .03; 1.5 MG/1; MG/1
1 TABLET ORAL DAILY
Qty: 84 TABLET | Refills: 4 | Status: SHIPPED | OUTPATIENT
Start: 2023-08-17

## 2023-08-17 ASSESSMENT — EDINBURGH POSTNATAL DEPRESSION SCALE (EPDS)
THE THOUGHT OF HARMING MYSELF HAS OCCURRED TO ME: NEVER
I HAVE LOOKED FORWARD WITH ENJOYMENT TO THINGS: RATHER LESS THAN I USED TO
I HAVE BEEN ANXIOUS OR WORRIED FOR NO GOOD REASON: HARDLY EVER
I HAVE BEEN SO UNHAPPY THAT I HAVE BEEN CRYING: NO, NEVER
THINGS HAVE BEEN GETTING ON TOP OF ME: NO, I HAVE BEEN COPING AS WELL AS EVER
I HAVE FELT SAD OR MISERABLE: NO, NOT AT ALL
I HAVE BEEN SO UNHAPPY THAT I HAVE HAD DIFFICULTY SLEEPING: NOT AT ALL
TOTAL SCORE: 3
I HAVE FELT SCARED OR PANICKY FOR NO GOOD REASON: NO, NOT AT ALL
I HAVE BLAMED MYSELF UNNECESSARILY WHEN THINGS WENT WRONG: NOT VERY OFTEN
I HAVE BEEN ABLE TO LAUGH AND SEE THE FUNNY SIDE OF THINGS: AS MUCH AS I ALWAYS COULD

## 2023-09-06 ENCOUNTER — TELEPHONE (OUTPATIENT)
Dept: OBGYN | Age: 28
End: 2023-09-06

## 2023-11-14 ENCOUNTER — OFFICE VISIT (OUTPATIENT)
Dept: FAMILY MEDICINE CLINIC | Facility: CLINIC | Age: 28
End: 2023-11-14
Payer: MEDICAID

## 2023-11-14 VITALS
BODY MASS INDEX: 29.5 KG/M2 | TEMPERATURE: 98 F | HEIGHT: 63 IN | HEART RATE: 85 BPM | WEIGHT: 166.5 LBS | OXYGEN SATURATION: 99 % | DIASTOLIC BLOOD PRESSURE: 62 MMHG | SYSTOLIC BLOOD PRESSURE: 102 MMHG | RESPIRATION RATE: 16 BRPM

## 2023-11-14 DIAGNOSIS — E55.9 VITAMIN D DEFICIENCY: ICD-10-CM

## 2023-11-14 DIAGNOSIS — Z00.00 WELL ADULT EXAM: Primary | ICD-10-CM

## 2023-11-14 DIAGNOSIS — Z51.81 MEDICATION MONITORING ENCOUNTER: ICD-10-CM

## 2023-11-14 DIAGNOSIS — Z13.220 SCREENING CHOLESTEROL LEVEL: ICD-10-CM

## 2023-11-14 DIAGNOSIS — Z28.21 INFLUENZA VACCINATION DECLINED BY PATIENT: ICD-10-CM

## 2023-11-14 DIAGNOSIS — Z78.9 BREASTFEEDING (INFANT): ICD-10-CM

## 2023-11-14 DIAGNOSIS — K59.00 CONSTIPATION, UNSPECIFIED CONSTIPATION TYPE: ICD-10-CM

## 2023-11-14 RX ORDER — PNV NO.95/FERROUS FUM/FOLIC AC 28MG-0.8MG
1 TABLET ORAL DAILY
Qty: 90 TABLET | Refills: 3 | Status: SHIPPED | OUTPATIENT
Start: 2023-11-14 | End: 2023-11-16

## 2023-11-14 RX ORDER — FAMOTIDINE 20 MG
1 TABLET ORAL DAILY
COMMUNITY
End: 2023-11-14

## 2023-11-15 ENCOUNTER — LABORATORY ENCOUNTER (OUTPATIENT)
Dept: LAB | Age: 28
End: 2023-11-15
Attending: STUDENT IN AN ORGANIZED HEALTH CARE EDUCATION/TRAINING PROGRAM
Payer: MEDICAID

## 2023-11-15 DIAGNOSIS — Z13.220 SCREENING CHOLESTEROL LEVEL: ICD-10-CM

## 2023-11-15 DIAGNOSIS — E55.9 VITAMIN D DEFICIENCY: ICD-10-CM

## 2023-11-15 DIAGNOSIS — Z51.81 MEDICATION MONITORING ENCOUNTER: ICD-10-CM

## 2023-11-15 LAB
ALBUMIN SERPL-MCNC: 4.4 G/DL (ref 3.4–5)
ALBUMIN/GLOB SERPL: 1.4 {RATIO} (ref 1–2)
ALP LIVER SERPL-CCNC: 69 U/L
ALT SERPL-CCNC: 24 U/L
ANION GAP SERPL CALC-SCNC: 6 MMOL/L (ref 0–18)
AST SERPL-CCNC: 10 U/L (ref 15–37)
BILIRUB SERPL-MCNC: 1 MG/DL (ref 0.1–2)
BUN BLD-MCNC: 12 MG/DL (ref 9–23)
CALCIUM BLD-MCNC: 9.6 MG/DL (ref 8.5–10.1)
CHLORIDE SERPL-SCNC: 107 MMOL/L (ref 98–112)
CHOLEST SERPL-MCNC: 237 MG/DL (ref ?–200)
CO2 SERPL-SCNC: 28 MMOL/L (ref 21–32)
CREAT BLD-MCNC: 0.8 MG/DL
EGFRCR SERPLBLD CKD-EPI 2021: 104 ML/MIN/1.73M2 (ref 60–?)
FASTING PATIENT LIPID ANSWER: YES
FASTING STATUS PATIENT QL REPORTED: YES
GLOBULIN PLAS-MCNC: 3.2 G/DL (ref 2.8–4.4)
GLUCOSE BLD-MCNC: 94 MG/DL (ref 70–99)
HDLC SERPL-MCNC: 63 MG/DL (ref 40–59)
LDLC SERPL CALC-MCNC: 160 MG/DL (ref ?–100)
NONHDLC SERPL-MCNC: 174 MG/DL (ref ?–130)
OSMOLALITY SERPL CALC.SUM OF ELEC: 292 MOSM/KG (ref 275–295)
POTASSIUM SERPL-SCNC: 4.3 MMOL/L (ref 3.5–5.1)
PROT SERPL-MCNC: 7.6 G/DL (ref 6.4–8.2)
SODIUM SERPL-SCNC: 141 MMOL/L (ref 136–145)
TRIGL SERPL-MCNC: 81 MG/DL (ref 30–149)
VIT D+METAB SERPL-MCNC: 19 NG/ML (ref 30–100)
VLDLC SERPL CALC-MCNC: 16 MG/DL (ref 0–30)

## 2023-11-15 PROCEDURE — 80061 LIPID PANEL: CPT

## 2023-11-15 PROCEDURE — 80053 COMPREHEN METABOLIC PANEL: CPT

## 2023-11-15 PROCEDURE — 82306 VITAMIN D 25 HYDROXY: CPT

## 2023-11-15 PROCEDURE — 36415 COLL VENOUS BLD VENIPUNCTURE: CPT

## 2023-11-16 ENCOUNTER — TELEPHONE (OUTPATIENT)
Dept: FAMILY MEDICINE CLINIC | Facility: CLINIC | Age: 28
End: 2023-11-16

## 2023-11-16 DIAGNOSIS — Z78.9 BREASTFEEDING (INFANT): ICD-10-CM

## 2023-11-16 RX ORDER — PNV NO.95/FERROUS FUM/FOLIC AC 28MG-0.8MG
1 TABLET ORAL DAILY
Qty: 90 TABLET | Refills: 3 | Status: SHIPPED | OUTPATIENT
Start: 2023-11-16 | End: 2023-12-16

## 2023-11-16 NOTE — TELEPHONE ENCOUNTER
Recd fax from [facility] that new prescription is needed for prenatals. Fax placed in [address] [place], [place] [address]d folder.

## 2023-11-20 NOTE — PROGRESS NOTES
Good morning Carol Garcia,     Your lab results showed a mildly low vitamin D level and elevated cholesterol levels, but otherwise were within normal range. Please start a vitamin D supplement (dose 1000 units daily; this dose can be found over the counter) to help replete your vitamin D level. Sometimes postpartum changes can affect your cholesterol level, so I would recommend cutting back on sugar and red meat intake, exercising as tolerated, and continuing to breastfeed at this time. I would recommend rechecking your labs in 12 months for monitoring. Please let me know if you have any questions.      Dr. Sameera Gallardo

## 2024-04-28 ENCOUNTER — APPOINTMENT (OUTPATIENT)
Dept: GENERAL RADIOLOGY | Age: 29
End: 2024-04-28
Attending: EMERGENCY MEDICINE
Payer: MEDICAID

## 2024-04-28 ENCOUNTER — HOSPITAL ENCOUNTER (EMERGENCY)
Age: 29
Discharge: HOME | End: 2024-04-28
Attending: EMERGENCY MEDICINE
Payer: MEDICAID

## 2024-04-28 VITALS
SYSTOLIC BLOOD PRESSURE: 133 MMHG | WEIGHT: 155 LBS | DIASTOLIC BLOOD PRESSURE: 90 MMHG | HEART RATE: 88 BPM | BODY MASS INDEX: 27.46 KG/M2 | HEIGHT: 63 IN | RESPIRATION RATE: 18 BRPM | TEMPERATURE: 97 F | OXYGEN SATURATION: 99 %

## 2024-04-28 DIAGNOSIS — S96.911A STRAIN OF RIGHT FOOT, INITIAL ENCOUNTER: Primary | ICD-10-CM

## 2024-04-28 PROCEDURE — 99284 EMERGENCY DEPT VISIT MOD MDM: CPT

## 2024-04-28 PROCEDURE — 73630 X-RAY EXAM OF FOOT: CPT | Performed by: EMERGENCY MEDICINE

## 2024-04-28 PROCEDURE — 99283 EMERGENCY DEPT VISIT LOW MDM: CPT

## 2024-04-29 NOTE — DISCHARGE INSTRUCTIONS
Ice.  Ibuprofen 600 mg 3 times a day with food.  Weightbearing as tolerated.  If not improving follow-up with orthopedics.

## 2024-04-29 NOTE — ED PROVIDER NOTES
Patient Seen in: [place] [facility] In [place]      History     Chief Complaint   Patient presents with    Leg or Foot Injury     Stated Complaint: left foot pain    Subjective:   HPI    Patient is a very pleasant 28-year-old presents with left foot pain.  Rolled her foot going down the stairs prior to come to the ER.  Pain and swelling over the fifth metatarsal.  No ankle pain.  No knee pain.  No other specific complaints here.    Objective:   Past Medical History:    Allergic rhinitis    Borderline personality disorder (HCC)    Depression    Eating disorder    Esophageal ulcer without bleeding    Factor V Leiden deficiency    Fibromyalgia    Headache    Hypovitaminosis D    Migraines    Primary Raynaud's phenomenon    PTSD (post-traumatic stress disorder)              Past Surgical History:   Procedure Laterality Date      2021    Other      tubes in ears when child     Other surgical history  2014    Procedure: ESOPHAGOGASTRODUODENOSCOPY (EGD);  Surgeon: Theodore Timmons MD;  Location:  ENDOSCOPY    [place] teeth removed                  Social History     Socioeconomic History    Marital status:    Tobacco Use    Smoking status: Former     Current packs/day: 0.00     Types: Cigarettes     Quit date: 2019     Years since quittin.0    Smokeless tobacco: Never    Tobacco comments:     4-5/day   Vaping Use    Vaping status: Former   Substance and Sexual Activity    Alcohol use: Not Currently     Comment: \"Rarely\"    Drug use: Not Currently     Frequency: 4.0 times per week     Types: Cannabis     Comment: 1 gm 3-4 times/week   Other Topics Concern    Caffeine Concern No    Exercise No    Seat Belt No    Special Diet No    Stress Concern No    Weight Concern No     Social Determinants of Health     Financial Resource Strain: Low Risk  (2023)    Received from [facility], [facility]    Financial Resource Strain     In the past year, have  you or any family members you live with been unable to get any of the following when it was really needed? Check all that apply.: None   Food Insecurity: Food Insecurity Present (7/12/2023)    [facility]    Food Insecurity     RETIRE How often in the past 12 months would you say you are worried or stressed about having enough money to buy nutritious meals? : Sometimes   Transportation Needs: No Transportation Needs (7/12/2023)    [facility]    PRAPARE - Transportation     In the past 12 months, has lack of transportation kept you from medical appointments or from getting medications?: No     In the past 12 months, has lack of transportation kept you from meetings, work, or from getting things needed for daily living?: No   Social Connections: Socially Integrated (7/12/2023)    [facility]    Social Connections     How often do you see or talk to people that you care about and feel close to? (For example: talking to friends on the phone, visiting friends or family, going to [employer] or club meetings): 5 or more times a week              Review of Systems    Positive for stated complaint: left foot pain  Other systems are as noted in HPI.  Constitutional and vital signs reviewed.      All other systems reviewed and negative except as noted above.    Physical Exam     ED Triage Vitals [04/28/24 2121]   /90   Pulse 88   Resp 18   Temp 97.4 °F (36.3 °C)   Temp src Temporal   SpO2 99 %   O2 Device None (Room air)       Current:/90   Pulse 88   Temp 97.4 °F (36.3 °C) (Temporal)   Resp 18   Ht 160 cm (5' 3\")   Wt 70.3 kg   LMP 03/22/2024 (Approximate)   SpO2 99%   BMI 27.46 kg/m²         Physical Exam    General: Well-appearing patient of stated age resting comfortably  HEENT: Normocephalic atraumatic.  Nonicteric sclera.  Moist mucous  membranes  Lungs: No tachypnea  Cardiac: No tachycardia  Skin: No rashes, pallor  Neuro: No focal deficits.  Extremities: Swelling and tenderness over the fifth metatarsal.  No tenderness or swelling over the lateral malleolus.  Neurovascular tact distally    ED Course   Labs Reviewed - No data to display          Left foot x-rays: I personally reviewed the films and my independent interpertaion showed no fracture.  Official report reviewed normal.         MDM      Patient presents after rolling her foot.  She has pain swelling and tenderness over the fifth metatarsal.  Differential includes fracture, sprain, other.  Patient had ibuprofen prior to coming.  Declines pain medicines here.  Was given ice pack.  Will proceed to x-ray.        X-rays negative.  Likely strain.  Was put in a postop shoe.  Has her own crutches.  Ice, ibuprofen.  Follow-up with orthopedics if not improving.                           Medical Decision Making      Disposition and Plan     Clinical Impression:  1. Strain of right foot, initial encounter         Disposition:  Discharge  4/28/2024 10:09 pm    Follow-up:  Mati Salazar MD  [address] [place] [address] [address] [place] [address] [id number] [phone]    Follow up in 1 week(s)      Parveen Villeda MD  [address] [place] [id number] [phone]    Follow up in 1 week(s)            Medications Prescribed:  There are no discharge medications for this patient.

## 2024-04-29 NOTE — ED INITIAL ASSESSMENT (HPI)
Patient here with left foot pain. States she was coming downstairs, rolled her foot/ankle. Pain to lateral foot. Denies head or neck injury. CMS intact to foot.

## 2024-09-09 ENCOUNTER — TELEPHONE (OUTPATIENT)
Dept: FAMILY MEDICINE CLINIC | Facility: CLINIC | Age: 29
End: 2024-09-09

## 2024-09-09 NOTE — TELEPHONE ENCOUNTER
Future Appointments   Date Time Provider Department Center   9/13/2024 11:30 AM Mati Salazar MD [facility] [address] [facility] [address]     Patient taking medication for possible skin infection. Patient prescribed Terbinafine, took last week-14 day treatment. Medication prescribed by OB, patient states skin has not cleared up-appointment scheduled. Patient wanted to discuss with nurse

## 2024-09-12 NOTE — TELEPHONE ENCOUNTER
Future Appointments   Date Time Provider Department Center   9/13/2024 11:30 AM Mati Salazar MD [facility] [address] [facility] [address]

## 2024-09-13 ENCOUNTER — OFFICE VISIT (OUTPATIENT)
Dept: FAMILY MEDICINE CLINIC | Facility: CLINIC | Age: 29
End: 2024-09-13
Payer: MEDICAID

## 2024-09-13 VITALS
TEMPERATURE: 98 F | BODY MASS INDEX: 26.93 KG/M2 | HEART RATE: 98 BPM | RESPIRATION RATE: 16 BRPM | SYSTOLIC BLOOD PRESSURE: 110 MMHG | DIASTOLIC BLOOD PRESSURE: 60 MMHG | OXYGEN SATURATION: 99 % | WEIGHT: 152 LBS | HEIGHT: 63 IN

## 2024-09-13 DIAGNOSIS — B35.4 TINEA CORPORIS: Primary | ICD-10-CM

## 2024-09-13 PROCEDURE — 99213 OFFICE O/P EST LOW 20 MIN: CPT | Performed by: STUDENT IN AN ORGANIZED HEALTH CARE EDUCATION/TRAINING PROGRAM

## 2024-09-13 RX ORDER — ITRACONAZOLE 100 MG/1
200 CAPSULE ORAL DAILY
Qty: 14 CAPSULE | Refills: 0 | Status: SHIPPED | OUTPATIENT
Start: 2024-09-13 | End: 2024-09-20

## 2024-09-13 RX ORDER — TERBINAFINE HYDROCHLORIDE 250 MG/1
250 TABLET ORAL DAILY
Refills: 0 | Status: CANCELLED | OUTPATIENT
Start: 2024-09-13

## 2024-09-13 RX ORDER — HYDROXYZINE HYDROCHLORIDE 25 MG/1
TABLET, FILM COATED ORAL
Qty: 30 TABLET | Refills: 1 | Status: SHIPPED | OUTPATIENT
Start: 2024-09-13

## 2024-09-13 RX ORDER — HYDROXYZINE HYDROCHLORIDE 25 MG/1
25 TABLET, FILM COATED ORAL 3 TIMES DAILY PRN
Refills: 0 | Status: CANCELLED | OUTPATIENT
Start: 2024-09-13

## 2024-09-13 NOTE — PROGRESS NOTES
Subjective:      Chief Complaint   Patient presents with    Rash     All over her body - states OB/GYN gave her terfinafine for 14 days, states she finished medication - has been off for 2 weeks and now started itching again     HISTORY OF PRESENT ILLNESS  HPI  HPI obtained per patient report.  Katherine Velasquez is a pleasant 28 year old female presenting with a rash.     She has had an itchy rash on her back, abdomen, and inguinal regions intermittently since she was pregnant.   Her OB recently prescribed a 2 week course of terbinafine for her, but she did not notice any improvement of her symptoms with this medication. She finished the course a few weeks ago.     PAST PATIENT HISTORY  Past Medical History:    Allergic rhinitis    Borderline personality disorder (HCC)    Depression    Eating disorder    Esophageal ulcer without bleeding    Factor V Leiden deficiency    Fibromyalgia    Headache    Hypovitaminosis D    Migraines    Primary Raynaud's phenomenon    PTSD (post-traumatic stress disorder)     Past Surgical History:   Procedure Laterality Date      2021    Other      tubes in ears when child     Other surgical history  2014    Procedure: ESOPHAGOGASTRODUODENOSCOPY (EGD);  Surgeon: Theodore Timmons MD;  Location:  ENDOSCOPY    [place] teeth removed         CURRENT MEDICATIONS  Outpatient Medications Marked as Taking for the 24 encounter (Office Visit) with Mati Salazar MD   Medication Sig Dispense Refill    hydrOXYzine 25 MG Oral Tab Take 1 to 4 tablets by mouth every 6 hours as needed for itching 30 tablet 1    itraconazole 100 MG Oral Cap Take 2 capsules (200 mg total) by mouth daily for 7 days. 14 capsule 0       HEALTH MAINTENANCE  Immunization History   Administered Date(s) Administered    DTAP 1997, 02/10/1998, 1998, 1999, 2003    HEP B 10/13/1997, 1997, 1998    HIB 1997, 02/10/1998, 1998, 1999    Hpv Virus Vaccine 9 [place] Im  10/08/2019    IPV 1997, 02/10/1998, 1999, 2003    Influenza 2020    MMR 10/06/1998, 2003    Meningococcal-Menactra 2016    RHO(D) Immune Globulin 2023, 2023    TDAP 10/17/2016    Varicella 10/06/1998   Deferred Date(s) Deferred    FLUZONE 6 months and older PFS 0.5 ml (94028) 2023       ALLERGIES AND DRUG REACTIONS  Allergies   Allergen Reactions    Albuterol HIVES    Bupropion HIVES    Cefdinir HIVES    Loperamide OTHER (SEE COMMENTS)     Tongue turns black  Tongue turns black  Tongue turns black    Dust Mite Extract OTHER (SEE COMMENTS)    Gluten Meal OTHER (SEE COMMENTS)    Milk Protein Extract OTHER (SEE COMMENTS)    Minocycline HIVES    Wheat Bran OTHER (SEE COMMENTS)       Family History   Problem Relation Age of Onset    Hypertension Father     High Cholesterol Father     Depression Father     Cancer Paternal Grandfather     Depression Mother     Other (Other) Mother         UCTD    Bleeding Disorders Mother     OCD Sister     Depression Sister     Other (Other) Sister         SLE    Bipolar Disorder Brother     Anxiety Brother     Depression Brother     Depression Brother     Anxiety Brother     Diabetes Maternal Grandmother     Asthma Maternal Grandmother     Cancer Paternal Grandmother     Asthma Brother     Depression Brother      Social History     Socioeconomic History    Marital status:    Tobacco Use    Smoking status: Former     Current packs/day: 0.00     Types: Cigarettes     Quit date: 2019     Years since quittin.3    Smokeless tobacco: Never    Tobacco comments:     4-5/day   Vaping Use    Vaping status: Former   Substance and Sexual Activity    Alcohol use: Not Currently     Comment: \"Rarely\"    Drug use: Not Currently     Frequency: 4.0 times per week     Types: Cannabis     Comment: 1 gm 3-4 times/week   Other Topics Concern    Caffeine Concern No    Exercise No    Seat Belt No    Special Diet No    Stress Concern No     Weight Concern No     Social Determinants of Health     Financial Resource Strain: Low Risk  (7/12/2023)    Received from [facility], [facility]    Financial Resource Strain     In the past year, have you or any family members you live with been unable to get any of the following when it was really needed? Check all that apply.: None   Food Insecurity: Food Insecurity Present (7/12/2023)    Received from [facility], [facility]    Food Insecurity     RETIRE How often in the past 12 months would you say you are worried or stressed about having enough money to buy nutritious meals? : Sometimes   Transportation Needs: No Transportation Needs (7/12/2023)    Received from [facility], [facility], [facility]    PRAPARE - Transportation     In the past 12 months, has lack of transportation kept you from medical appointments or from getting medications?: No     In the past 12 months, has lack of transportation kept you from meetings, work, or from getting things needed for daily living?: No   Social Connections: Socially Integrated (7/12/2023)    Received from [facility], [facility]    Social Connections     How often do you see or talk to people that you care about and feel close to? (For example: talking to friends on the phone, visiting friends or family, going to [employer] or club meetings): 5 or more times a week       Review of Systems   Skin:  Positive for rash.   All other systems reviewed and are negative.         Objective:      /60   Pulse 98   Temp 97.5 °F (36.4 °C) (Temporal)   Resp 16   Ht 5' 3\" (1.6 m)   Wt 152 lb (68.9 kg)   LMP 09/07/2024 (Exact Date)   SpO2 99%   BMI 26.93 kg/m²   Body mass index is 26.93 kg/m².    Physical Exam  Vitals reviewed.   Constitutional:       General: She is not in acute distress.     Appearance: She is not ill-appearing, toxic-appearing or diaphoretic.   HENT:       Head: Normocephalic and atraumatic.   Cardiovascular:      Rate and Rhythm: Normal rate.   Pulmonary:      Effort: Pulmonary effort is normal.   Abdominal:      General: There is no distension.   Musculoskeletal:      Cervical back: Neck supple.   Skin:     General: Skin is warm and dry.      Coloration: Skin is not jaundiced or pale.      Findings: Rash (minimally erythematous plaques with mildly raised, mildly more erythematous, and irregular borders on her abdomen and back. the plaques are at least 1.3cm in diameter) present. No bruising.   Neurological:      Mental Status: She is alert and oriented to person, place, and time.   Psychiatric:         Mood and Affect: Mood normal.            Assessment and Plan:      1. Tinea corporis (Primary)  -     hydrOXYzine HCl; Take 1 to 4 tablets by mouth every 6 hours as needed for itching  Dispense: 30 tablet; Refill: 1  -     Itraconazole; Take 2 capsules (200 mg total) by mouth daily for 7 days.  Dispense: 14 capsule; Refill: 0    Return if symptoms worsen or fail to improve.    - her rash is consistent with tinea corporis  - did not respond to a 2 week course of terbinafine  - recommended a course of itraconazole as prescribed  - we discussed that she may take hydroxyzine as needed for itching, but to be cautious of possible drowsiness side effects of this medication  - recommended calling if her symptoms do not improve with the above therapies; if this occurs, we may extend her course of itraconazole and obtain labs for further evaluation       Patient verbalized understanding of assessment and recommendations. All questions and concerns were addressed.    Electronically signed by Mati Salzaar MD

## 2025-02-25 NOTE — TELEPHONE ENCOUNTER
Received request and authorization from patient to have medical records from 2/18/24 to 2/18/25 sent to: [facility], [address] [place] [address], [place] [address], with phone [phone] and fax [phone].   Patient [place] out of state.  Faxed to [employer] for processing.  
tox/DC instructions

## 2025-02-26 NOTE — PROCEDURES
The office order for PCP removal request is Approved and finalized on February 26, 2025.    Removed Mati Salazar MD as the patient's Primary Care Physician

## (undated) NOTE — ED AVS SNAPSHOT
Katherine Morales   MRN: [id number]    Department:  [facility] in [place]   Date of Visit:  4/30/2019           Disclosure     Insurance plans vary and the physician(s) referred by the ER may not be covered by your plan.  Please contact tell this physician (or your personal doctor if your instructions are to return to your personal doctor) about any new or lasting problems. The primary care or specialist physician will see patients referred from the [facility] Emergency Department.  Ervin Vigil

## (undated) NOTE — LETTER
Date & Time: 3/31/2021, 12:51 PM  Patient: Ermias Baker  Encounter Provider(s):    Pedrito Cole MD       To Whom It May Concern:    Kailash Goldberg was seen and treated in our department on 3/31/2021. She may return to worn 01/03/2021.     If you have an

## (undated) NOTE — ED AVS SNAPSHOT
Netta Tabor   MRN: [id number]    Department:  [facility] Emergency Department   Date of Visit:  10/2/2019           Disclosure     Insurance plans vary and the physician(s) referred by the ER may not be covered by your plan.  Please contact your tell this physician (or your personal doctor if your instructions are to return to your personal doctor) about any new or lasting problems. The primary care or specialist physician will see patients referred from the [facility] Emergency Department.  Rober Merino

## (undated) NOTE — ED AVS SNAPSHOT
Wilner Chisholm   MRN: [id number]    Department:  [facility] Emergency Department   Date of Visit:  4/28/2019           Disclosure     Insurance plans vary and the physician(s) referred by the ER may not be covered by your plan.  Please contact your tell this physician (or your personal doctor if your instructions are to return to your personal doctor) about any new or lasting problems. The primary care or specialist physician will see patients referred from the [facility] Emergency Department.  Lupillo Mahmood

## (undated) NOTE — LETTER
Date: 10/12/2017    Patient Name: Mark Lazaro          Dear Dr. Rolf Le,       Would you be kind enough to evaluate and consult with [facility] for repetitive blunt head trauma from cheerleading and possible concussion. Thanks so much.                Rachel Hernandez

## (undated) NOTE — LETTER
Date: 11/11/2020    Patient Name: Sanjana Party          To Whom it may concern: This letter has been written at the patient's request. The above patient was seen at the [facility] for treatment of a medical condition.     This patient camron

## (undated) NOTE — ED AVS SNAPSHOT
Katherine Morales   MRN: [id number]    Department:  [facility] Emergency Department   Date of Visit:  11/9/2018           Disclosure     Insurance plans vary and the physician(s) referred by the ER may not be covered by your plan.  Please contact your tell this physician (or your personal doctor if your instructions are to return to your personal doctor) about any new or lasting problems. The primary care or specialist physician will see patients referred from the [facility] Emergency Department.  Yissel Cintron

## (undated) NOTE — LETTER
Date & Time: 10/20/2020, 1:12 PM  Patient: Tanya Moise      To Whom It May Concern:    Brock Dietz was seen and treated in the ER on 10/14/20. Please excuse her from work from October 14th-18th. She can return to work 10/21/20.     If you have any qu

## (undated) NOTE — LETTER
Marissa Chaney [address]  [place], [address] [facility]    Consent for Operation  Date: __________________                                Time: _______________    1.  I authorize the performance upon Mark Lazaro the following operation:  Procedure( procedure has been videotaped, the surgeon will obtain the original videotape. The hospital will not be responsible for storage or maintenance of this tape.   7. For the purpose of advancing medical education, I consent to the admittance of observers to the STATEMENTS REQUIRING INSERTION OR COMPLETION WERE FILLED IN.     Signature of Patient:   ___________________________    When the patient is a minor or mentally incompetent to give consent:  Signature of person authorized to consent for patient: ____________ drugs/illegal medications). Failure to inform my anesthesiologist about these medicines may increase my risk of anesthetic complications. iv. If I am allergic to anything or have had a reaction to anesthesia before.   3. I understand how the anesthesia med I have discussed the procedure and information above with the patient (or patient’s representative) and answered their questions. The patient or their representative has agreed to have anesthesia services.     _______________________________________________

## (undated) NOTE — LETTER
Date & Time: 11/10/2020, 8:23 PM  Patient: Madi Space  Encounter Provider(s):    Delilah Reyes MD       To Whom It May Concern:    Jason Anaya was seen and treated in our department on 11/10/2020.  She can return to work 11/12/2020  If you have a

## (undated) NOTE — LETTER
Date: 11/19/2020    Patient Name: Nemesio Caballero          To Whom it may concern: This letter has been written at the patient's request. The above patient was seen at the [facility] for treatment of a medical condition.     This patient camron

## (undated) NOTE — LETTER
Date & Time: 4/15/2021, 6:58 PM  Patient: Tabitha Libman  Encounter Provider(s):    Kobi Carter MD       To Whom It May Concern:    Nahid Sarmiento was seen and treated in our department on 4/15/2021. She can return to work on Monday 4/19/2021.     If

## (undated) NOTE — LETTER
Date: 10/12/2017    Patient Name: Ermias Baker          To Whom it may concern:      No participation in gym or sports until evaluated by Dr. Katlyn Chisholm for concussion evaluation.          Sincerely,    Olya Valdes MD

## (undated) NOTE — LETTER
Date & Time: 11/9/2018, 5:39 PM  Patient: Lisa Rene  Encounter Provider(s):    On File, E SHEILA Attending  MD Dianna Willis APN       To Whom It May Concern:    Sue Ybarra was seen and treated in our department on 11/9/2018.  She s